# Patient Record
Sex: MALE | Race: WHITE | Employment: OTHER | URBAN - METROPOLITAN AREA
[De-identification: names, ages, dates, MRNs, and addresses within clinical notes are randomized per-mention and may not be internally consistent; named-entity substitution may affect disease eponyms.]

---

## 2017-02-02 ENCOUNTER — OFFICE VISIT (OUTPATIENT)
Dept: NEUROLOGY | Age: 72
End: 2017-02-02

## 2017-02-02 DIAGNOSIS — R41.3 MEMORY LOSS: Primary | ICD-10-CM

## 2017-02-02 DIAGNOSIS — F31.31 BIPOLAR AFFECTIVE DISORDER, CURRENTLY DEPRESSED, MILD (HCC): ICD-10-CM

## 2017-02-02 NOTE — PROGRESS NOTES
[address] [facility],[address]  [address] [facility]   [facility] [address] [facility] [address] [facility]   [phone] Office   [phone] Fax      Neuropsychology    Exam # 2      Initial Diagnostic Interview Note      Referral:  Megan Bella MD    Landy Stevens is a 80 y.o. right handed  male who was accompanied to the initial clinical interview on 2/2/17 . Please refer to his medical records for details pertaining to his history. When I saw him last: Landy Stevens is a 72 y.o. right handed   male who was accompanied by his spouse to the initial clinical interview on 8/18/15. Please refer to his medical records for details pertaining to his history. He has a PhD in Philosophy and denied any history of previously diagnosed LD and/or receipt of special education services. 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 He has noticed that he has more difficulties remembering names and difficulties coming up with words. Thought process slower. Slower to process information. Used to be very good at works. Mild short term memory issues on the day-to-day basis. More difficulties following numbers. He has a harder time transcribing messages on the phone. Took two phone numbers down and spouse had found that he had made mistakes on both of the phone numbers. Trouble transposing numbers at times on paper. Hears something but writes something different. No previous history of ADD/ADHD issues. The memory problems have started to get worse over the past two years or so. Recognizes that everybody's memory starts to fade, but this is faster. Something does not feel normal. Will struggle when reading instructions and following them. Used to be very good at following these directions. Spouse has to double check things he does, which frustrates him. Missing details.  No major known neurologic history, including no recent TBI, meningitis/encephalitis, ATIYA Fever, Lupus, Lyme, CVA, sz, toxic chemical exposure requiring treatment, etc. Used to be able to find his way back from a new place when driving, now cannot. He is independent for medications. No problems paying the bills. Day-to-day chores okay. Sleep varies. Appetite is fine. Balance is less good now than it was previously. No falls, but trips, and slides into walls. Gross motor coordination not as good. Fine motor coordination was never good. No changes in sense of taste or smell. No changes in libido. Is on Lithium right now. Not feeling much help with antidepressant but concern is that if those meds are upped he may become manic. For relaxation, he enjoys woodworking, watching porn occasionally. His father - the last year or two of his life had dementia, he passed away at 80. His mother may have dementia now, she is 80. My diagnostic impressions at that time included: This patient generated a mixed normal/abnormal range Neuropsychological Evaluation with respect to neurocognitive functioning. In this regard, he is showing problems with verbal fluency, sustained attention, auditory learning, auditory memory, and bilateral motor skills. His IQ is within the very superior range and his performance across all other neurocognitive domains assessed, including mental status, confrontation naming, processing speed, working memory, verbal comprehension, perceptual reasoning, and executive functioning remain normal. Emotionally, there is support for both depression and anxiety . He reports being diagnosed with bipolar disorder in the past but that was not seen during this evaluation.      In my opinion, this profile is consistent with an evolving organic process that is currently at a mild level of severity. It is masked to some degree by his excellent IQ and verbal skills (and other neurocognitive domain strengths listed above). AD likely.  The profile is inconsistent with pseudodementia, but mood issues will exacerbate. I suggest consideration for memory management medication, medication for attention, and a review of his current psychiatric medication management for anxiety and depression if this/these are not medically contraindicated. Mental health counseling is strongly advised and his risk for self-harm needs to be monitored by his mental health care providers. The patient should be encouraged to remain as mentally, socially, and physically active as possible.      The patient's generated cognitive difficulties are significant to the degree whereby it is my opinion that he should not live independently without appropriate supervision for medications and finances. While competent, he also likely needs assistance with high level decision making. He can assign a POA if this has not been done so already. Marked problems with attention and reaction time raise concern regarding driving safety, and I suggest consideration for a formal evaluation of same. This issue has been caught early on, and I hope that he and the family recognize this as positive. Baseline now established. Follow up prn. Clinical correlation is, of course, indicated.     I will discuss these findings with the patient and family when they follow up with me in the near future. A follow up Neuropsychological Evaluation is indicated on a prn basis.      DIAGNOSES: Dementia Without Behavioral Disturbance (Mild)   Anxiety - Mild  Depression - Mild  Hx of Bipolar Disorder    Since I saw him last, the patient reported that, compared to when he was tested last time, he has noticed an improvement in his memory. Spouse says he struggles with names, telephone numbers, and dates. Continues to sometime forget the content of conversations. Keeps a journal, and will watch a movie in the evening, and next morning writes in his journal, and 75% of the time he has to go to the listing of the movie to remember the name of the movie. He is on Aricept.   Also on medication for mood (see below). He had a bout of depression in October which lasted persistently. Is being followed by Psychiatry. Switched to Zoloft (from buproprion). Depression lifted last week. Sees Dr. Cecil Montes De Oca. No new neurologic issues since I saw him last.  Spouse does say that he is better, is not as foggy, but memory issues do persist.  Independent for driving, medications, finances, day-to-day chores, etc.  Sleep is poor/worse. Has always had insomnia and it has gotten worse. Appetite is okay. No falls. Tried acupuncture. Mood is better as depression lifted last year. Can look things up on Google, directions, etc.      MMSE: do today    Clock: do today    TOPF:  47    Historian: Good  Praxis: No UE apraxia  R/L Orientation: Intact to self and to other  Dress: within normal limits   Weight: within normal limits   Appearance/Hygiene: within normal limits   Gait: within normal limits   Assistive Devices: Glasses  Mood: within normal limits   Affect: within normal limits   Comprehension: within normal limits   Thought Process: within normal limits   Expressive Language: within normal limits   Receptive Language: within normal limits   Motor:  No cognitive or motor perseveration  ETOH: Denied  Tobacco: fell off wagon twice but not smoking anymore  Illicit: Marijuana, uses every day, 1 very small pipe per day. About a month now, he started doing meditation, and kept himself clear 15 days before he started, 30 days after he started, so no marijuana in the past five weeks. SI/HI: Has passive suicidal thoughts without plan or intent. Was going to hang himself in 2008, but told spouse. Did have an attempt in 1980. Was hospitalized 2/2 the suicide attempt. No current, but did from October - passive thoughts, since depression has improved, no current thoughts. Denied HI. Psychosis: Denied  Insight: Within normal limits  Judgment: Within normal limits  Other Psych: Was diagnosed with Bipolar in 1996. Psychiatrist is Dr. Bear Chakraborty and [place]. Is not currently involved in psychotherapy. Is in marital therapy. Past Medical History   Diagnosis Date    Alzheimer disease 11/2016    Hughes's esophagus with esophagitis     CAD (coronary artery disease)      minor calcification in heart    Chronic obstructive pulmonary disease (HCC)      mild    Diverticulitis     Essential tremor     Prostate cancer ([facility] [address] 9/2004     prostatectomy    Psychiatric disorder      bipolar    Psychotic disorder     Restless leg syndrome        Past Surgical History   Procedure Laterality Date    Hx prostatectomy  2004    Hx tonsillectomy      Hx heent       deviated septum repair       Allergies   Allergen Reactions    Sulfa (Sulfonamide Antibiotics) Nausea and Vomiting     Oral only       Family History   Problem Relation Age of Onset    Diabetes Father     Cancer Maternal Grandmother      stomach cancer    Heart Disease Maternal Grandmother     Stroke Maternal Grandmother     Cancer Other     Heart Disease Other        Social History   Substance Use Topics    Smoking status: Former Smoker     Packs/day: 1.00     Years: 50.00     Quit date: 9/18/2014    Smokeless tobacco: Never Used    Alcohol use No       Current Outpatient Prescriptions   Medication Sig Dispense Refill    sertraline (ZOLOFT) 25 mg tablet Take  by mouth daily.  primidone (MYSOLINE) 50 mg tablet Take 1 Tab by mouth daily (with dinner). 30 Tab 6    donepezil (ARICEPT) 5 mg tablet TAKE 1 TAB BY MOUTH NIGHTLY. 30 Tab 11    omeprazole (PRILOSEC) 40 mg capsule Take 40 mg by mouth Daily (before breakfast).  COCONUT OIL PO Take  by mouth. 7.5 mL by mouth each morning, 7.5 mL at midday, 5 mL at dinner and 5 mL at bedtime      buPROPion (WELLBUTRIN) 100 mg tablet Take 1 Tab by mouth daily. 90 Tab 3    lithium carbonate 300 mg tablet Take 1 Tab by mouth two (2) times a day.  60 Tab 11    multivitamin (ONE A DAY) tablet Take 1 Tab by mouth every evening.  Aspirin, Buffered 81 mg tab Take 81 mg by mouth daily. Plan:  Obtain authorization for testing from insurance company. Report to follow once testing, scoring, and interpretation completed. ? Organic based neurocognitive issues versus mood disorder or combination of same. ? Problems organic, functional, or both? This note will not be viewable in [address]

## 2017-02-07 ENCOUNTER — OFFICE VISIT (OUTPATIENT)
Dept: NEUROLOGY | Age: 72
End: 2017-02-07

## 2017-02-07 DIAGNOSIS — G31.84 MINIMAL COGNITIVE IMPAIRMENT: ICD-10-CM

## 2017-02-07 DIAGNOSIS — Z86.59 HISTORY OF BIPOLAR DISORDER: ICD-10-CM

## 2017-02-07 DIAGNOSIS — R41.3 MEMORY LOSS: Primary | ICD-10-CM

## 2017-02-07 DIAGNOSIS — Z86.59 HISTORY OF ANXIETY: ICD-10-CM

## 2017-02-07 DIAGNOSIS — Z86.59 HISTORY OF DEPRESSION: ICD-10-CM

## 2017-02-07 NOTE — LETTER
2/8/2017 11:07 AM 
 
Patient:  Yohana Soto YOB: 1945 Date of Visit: 2/7/2017 Dear MD Emil Gaffnye Mag [facility] [id number] [address] [facility] [phone] VIA In Basket 
 : Thank you for referring Mr. Magdalena Robledo to me for evaluation/treatment. As you know, I have seen him twice now. During the first evaluation, there were clear signs of a mild dementia exacerbated by depression and anxiety. Despite a reported history of bipolar, I did not find evidence of same. He has been on Aricept and reports that his memory has improved and he is less foggy and his mood has improved as well. The spouse reports his memory as having improved but still impaired and clearly not back to baseline. She also reports that his mood has improved. On the current evaluation, the patient is showing a decline in attention and an improvement in memory to the point where most memory scores are now normal.  If this was a pseudodementia, we would not see the decline in attention, nor would we have seen other neurocognitive deficits that he is showing. Further complicating this atypical presentation is his very high IQ. Thus, this profile does not cleanly fit into any specific category, and I have not seen such a case in quite a long time. As such (and my report may be discombobulated), it may be wise to continue Aricept and to consider a medication for attention (non-stimulant, perhaps?) A referral to Neurology may also be wise. I have not seen a case where mood and memory improves, but attention declines, and have it not be anything other than dementia of atypical progression/onset. I could be wrong and this is all pseudodementia, but the neurocognitive declines seen would then not be explainable, either. Sorry, I feel my report is not as helpful as I had hoped.   I would like to see him again in six months to track this change better and with three data points in time I may be able to better clarify. If you have questions, please do not hesitate to call me. I look forward to following Mr. Holman along with you. Sincerely, Bethany Kang PsyD

## 2017-02-08 NOTE — PROGRESS NOTES
[address] [facility],5Th Floor  [address] [facility]   [facility] [address] [facility], Terri Bray .   [phone] Office   [phone] Fax      Neuropsychological Evaluation Report    Exam # 2    Referral:  Benjamin Solares MD    Daniele Jay is a 80 y.o. right handed  male who was accompanied to the initial clinical interview on 2/2/17 . Please refer to his medical records for details pertaining to his history. When I saw him last: Daniele Jay is a 72 y.o. right handed   male who was accompanied by his spouse to the initial clinical interview on 8/18/15. Please refer to his medical records for details pertaining to his history. He has a PhD in Philosophy and denied any history of previously diagnosed LD and/or receipt of special education services. 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 He has noticed that he has more difficulties remembering names and difficulties coming up with words. Thought process slower. Slower to process information. Used to be very good at works. Mild short term memory issues on the day-to-day basis. More difficulties following numbers. He has a harder time transcribing messages on the phone. Took two phone numbers down and spouse had found that he had made mistakes on both of the phone numbers. Trouble transposing numbers at times on paper. Hears something but writes something different. No previous history of ADD/ADHD issues. The memory problems have started to get worse over the past two years or so. Recognizes that everybody's memory starts to fade, but this is faster. Something does not feel normal. Will struggle when reading instructions and following them. Used to be very good at following these directions. Spouse has to double check things he does, which frustrates him. Missing details.  No major known neurologic history, including no recent TBI, meningitis/encephalitis, ATIYA Fever, Lupus, Lyme, CVA, sz, toxic chemical exposure requiring treatment, etc. Used to be able to find his way back from a new place when driving, now cannot. He is independent for medications. No problems paying the bills. Day-to-day chores okay. Sleep varies. Appetite is fine. Balance is less good now than it was previously. No falls, but trips, and slides into walls. Gross motor coordination not as good. Fine motor coordination was never good. No changes in sense of taste or smell. No changes in libido. Is on Lithium right now. Not feeling much help with antidepressant but concern is that if those meds are upped he may become manic. For relaxation, he enjoys woodworking, watching porn occasionally. His father - the last year or two of his life had dementia, he passed away at 80. His mother may have dementia now, she is 80. My diagnostic impressions at that time included: This patient generated a mixed normal/abnormal range Neuropsychological Evaluation with respect to neurocognitive functioning. In this regard, he is showing problems with verbal fluency, sustained attention, auditory learning, auditory memory, and bilateral motor skills. His IQ is within the very superior range and his performance across all other neurocognitive domains assessed, including mental status, confrontation naming, processing speed, working memory, verbal comprehension, perceptual reasoning, and executive functioning remain normal. Emotionally, there is support for both depression and anxiety . He reports being diagnosed with bipolar disorder in the past but that was not seen during this evaluation.      In my opinion, this profile is consistent with an evolving organic process that is currently at a mild level of severity. It is masked to some degree by his excellent IQ and verbal skills (and other neurocognitive domain strengths listed above). AD likely. The profile is inconsistent with pseudodementia, but mood issues will exacerbate.  I suggest consideration for memory management medication, medication for attention, and a review of his current psychiatric medication management for anxiety and depression if this/these are not medically contraindicated. Mental health counseling is strongly advised and his risk for self-harm needs to be monitored by his mental health care providers. The patient should be encouraged to remain as mentally, socially, and physically active as possible.      The patient's generated cognitive difficulties are significant to the degree whereby it is my opinion that he should not live independently without appropriate supervision for medications and finances. While competent, he also likely needs assistance with high level decision making. He can assign a POA if this has not been done so already. Marked problems with attention and reaction time raise concern regarding driving safety, and I suggest consideration for a formal evaluation of same. This issue has been caught early on, and I hope that he and the family recognize this as positive. Baseline now established. Follow up prn. Clinical correlation is, of course, indicated.     I will discuss these findings with the patient and family when they follow up with me in the near future. A follow up Neuropsychological Evaluation is indicated on a prn basis.      DIAGNOSES: Dementia Without Behavioral Disturbance (Mild)   Anxiety - Mild  Depression - Mild  Hx of Bipolar Disorder    Since I saw him last, the patient reported that, compared to when he was tested last time, he has noticed an improvement in his memory. Spouse says he struggles with names, telephone numbers, and dates. Continues to sometime forget the content of conversations. Keeps a journal, and will watch a movie in the evening, and next morning writes in his journal, and 75% of the time he has to go to the listing of the movie to remember the name of the movie. He is on Aricept.   Also on medication for mood (see below). He had a bout of depression in October which lasted persistently. Is being followed by Psychiatry. Switched to Zoloft (from buproprion). Depression lifted last week. Sees Dr. Aleks Ruvalcaba. No new neurologic issues since I saw him last.  Spouse does say that he is better, is not as foggy, but memory issues do persist.  Independent for driving, medications, finances, day-to-day chores, etc.  Sleep is poor/worse. Has always had insomnia and it has gotten worse. Appetite is okay. No falls. Tried acupuncture. Mood is better as depression lifted last year. Can look things up on Google, directions, etc.      MMSE: do today    Clock: do today    TOPF:  47    Historian: Good  Praxis: No UE apraxia  R/L Orientation: Intact to self and to other  Dress: within normal limits   Weight: within normal limits   Appearance/Hygiene: within normal limits   Gait: within normal limits   Assistive Devices: Glasses  Mood: within normal limits   Affect: within normal limits   Comprehension: within normal limits   Thought Process: within normal limits   Expressive Language: within normal limits   Receptive Language: within normal limits   Motor:  No cognitive or motor perseveration  ETOH: Denied  Tobacco: fell off wagon twice but not smoking anymore  Illicit: Marijuana, uses every day, 1 very small pipe per day. About a month now, he started doing meditation, and kept himself clear 15 days before he started, 30 days after he started, so no marijuana in the past five weeks. SI/HI: Has passive suicidal thoughts without plan or intent. Was going to hang himself in 2008, but told spouse. Did have an attempt in 1980. Was hospitalized 2/2 the suicide attempt. No current, but did from October - passive thoughts, since depression has improved, no current thoughts. Denied HI. Psychosis: Denied  Insight: Within normal limits  Judgment: Within normal limits  Other Psych: Was diagnosed with Bipolar in 1996.  [facility]. Is not currently involved in psychotherapy. Is in marital therapy. Past Medical History   Diagnosis Date    Alzheimer disease 11/2016    Hughes's esophagus with esophagitis     CAD (coronary artery disease)      minor calcification in heart    Chronic obstructive pulmonary disease (HCC)      mild    Diverticulitis     Essential tremor     Prostate cancer ([facility] [address] 9/2004     prostatectomy    Psychiatric disorder      bipolar    Psychotic disorder     Restless leg syndrome        Past Surgical History   Procedure Laterality Date    Hx prostatectomy  2004    Hx tonsillectomy      Hx heent       deviated septum repair       Allergies   Allergen Reactions    Sulfa (Sulfonamide Antibiotics) Nausea and Vomiting     Oral only       Family History   Problem Relation Age of Onset    Diabetes Father     Cancer Maternal Grandmother      stomach cancer    Heart Disease Maternal Grandmother     Stroke Maternal Grandmother     Cancer Other     Heart Disease Other        Social History   Substance Use Topics    Smoking status: Former Smoker     Packs/day: 1.00     Years: 50.00     Quit date: 9/18/2014    Smokeless tobacco: Never Used    Alcohol use No       Current Outpatient Prescriptions   Medication Sig Dispense Refill    sertraline (ZOLOFT) 25 mg tablet Take  by mouth daily.  primidone (MYSOLINE) 50 mg tablet Take 1 Tab by mouth daily (with dinner). 30 Tab 6    donepezil (ARICEPT) 5 mg tablet TAKE 1 TAB BY MOUTH NIGHTLY. 30 Tab 11    omeprazole (PRILOSEC) 40 mg capsule Take 40 mg by mouth Daily (before breakfast).  COCONUT OIL PO Take  by mouth. 7.5 mL by mouth each morning, 7.5 mL at midday, 5 mL at dinner and 5 mL at bedtime      buPROPion (WELLBUTRIN) 100 mg tablet Take 1 Tab by mouth daily. 90 Tab 3    lithium carbonate 300 mg tablet Take 1 Tab by mouth two (2) times a day. 60 Tab 11    multivitamin (ONE A DAY) tablet Take 1 Tab by mouth every evening.  Aspirin, Buffered 81 mg tab Take 81 mg by mouth daily. Plan:  Obtain authorization for testing from insurance company. Report to follow once testing, scoring, and interpretation completed. ? Organic based neurocognitive issues versus mood disorder or combination of same. ? Problems organic, functional, or both? This note will not be viewable in [address] Neuropsychological Test Results  Patient Testing 2/7/17 Report Completed 2/8/17  A Psychometrist Assisted w/ portions of this evaluation while under my direct  supervision    The following evaluation procedures/tests were administered:      Neuropsychologist Performed, Interpreted, & Reported:  Neuropsychological Mental Status Exam, Revised Memory & Behavior Checklist,  Mini Mental Status Exam, Clock Drawing Test, Merna-Melzack Pain Questionnaire, Test Of Premorbid Functioning, History Taking  & Clinical Interview With The Patient, Additional HIstory Taking w/ The Patient's Spouse,  Review Of Available Records. Psychometrist Administered under Neuropsychologist Supervision & Neuropsychologist Interpreted & Neuropsychologist Reported:  Verbal Fluency Tests, [facility] - Revised, Trailmaking Test Parts A & B, Wechsler Adult Intelligence Scale - IV, Dory Continuous Performance Test - III, [place] [employer] - II, Grooved Pegboard, Delgado Depression Inventory - II, Delgado Anxiety Inventory, Personality Assessment Inventory. Test Findings:  Test Findings:  Note:  The patients raw data have been compared with currently available norms which include demographic corrections for age, gender, and/or education. Sometimes, the patients scores are compared to demographically similar individuals as close to the patients age, education level, etc., as possible. \"Average\" is viewed as being +/- 1 standard deviation (SD) from the stated mean for a particular test score.   \"Low average\" is viewed as being between 1 and 2 SD below the mean, and above average is viewed as being 1 and 2 SD above the mean. Scores falling in the borderline range (between 1-1/2 and 2 SD below the mean) are viewed with particular attention as to whether they are normal or abnormal neurocognitive test scores. Other methods of inference in analyzing the test data are also utilized, including the pattern and range of scores in the profile, bilateral motor functions, and the presence, if any, of pathognomonic signs. Behaviorally, the patient was friendly and cooperative and appeared motivated to perform well during this examination. Within this context, the results of this evaluation are viewed as a valid reflection of the patients actual neurocognitive and emotional status. The patient's score of 28/30 on the Mini-Mental Status Exam was normal.  Registration of three words was 2/3 correct. Recall for three words after a brief delay was 2/3 correct. Clock drawing was normal.        His structured word list fluency, as assessed by the FAS Test, was within the mildly impaired range with a T score of 38. Category fluency was within the average range with a T score of 48. Confrontation naming ability, as assessed by the [facility] Test - Revised, was within the  Above average range at 60/60 correct (T = 68). This pattern of performance is not indicative of a patient who is at increased risk for day-to-day problems with verbal fluency or confrontation naming. The patient was administered the Dory Continuous Performance Test - III and review of the subscales within this instrument revealed numerous concerns for inattentiveness without impulsivity. This pattern of performance is indicative of a patient who is at increased risk for day-to-day problems with sustained visual attention/concentration. The patient is not showing problems with working memory capacity (70th %ile) or processing speed (63rd %ile) on the WAIS-IV.   His Verbal Comprehension Index score of 120 was within the superior range. His Perceptual Reasoning Index score of 117 was high average. These scores do not reflect a decline in functioning based on an assessment of premorbid functioning. The patient was administered the [place] Verbal Learning Test  - II and generated an impaired range (though positive) learning curve over five repeated auditory word list learning trials. An interference trial was normal.   Free and cued, short and long delayed recall were mildly impaired, a significant improvement from previous testing. Recognition rcall was normal but some intrusion errors were noted. Forced choice recall was normal.  This pattern of performance is indicative of a patient who is at increased risk for day-to-day problems with auditory learning and/or memory. The problem is quite mild. Simple timed visual motor sequencing (Trailmaking Test Part A) was within the average range with a T score of 45. His performance on a similar, but more complex task of timed visual motor sequencing (Trailmaking Test Part B) was within the below average range with a T score of 42. He made zero sequencing errors on this latter test.   This pattern of performance is not indicative of a patient who is at increased risk for day-to-day problems with executive functioning. Fine motor dexterity was within the mildly to moderately impaired range bilaterally. This does not raise concern for a particularly lateralized brain dysfunction. The patient rated his current level of pain as \"2/5 -  Discomforting\" on the Merna-Melzack Pain Questionnaire. He reported pain in his neck and upper back. His  Delgado Depression Inventory -II score of 5 was within the minimally depressed range. Her Delgado Anxiety Inventory score of 6 reflected minimal anxiety. The patient's responses on the Personality Assessment Inventory were deemed valid for interpretation.   Within this context, his self-concept is fixed and negative. He may not invite much social interaction, and is withdrawn and introverted. He does report suicidal thoughts on this measure and denied plan or intent with me. His level of treatment motivation is somewhat low. Impressions & Recommendations: This patient again generated a mixed normal/abnormal range Neuropsychological Evaluation with respect to neurocognitive functioning. In this regard, he is showing problems withsustained attention, auditory learning, auditory memory, and bilateral motor skills. His IQ remains within the superior range. Memory has improved significantly since previous testing. His performance across all other neurocognitive domains assessed, including mental status, confrontation naming, processing speed, working memory, verbal comprehension, perceptual reasoning, and executive functioning remain normal. Emotionally, there is support for both depression and anxiety, but both have improved since previous testing. I again do not see evidence of bipolar. In my opinion, dementia is quite stable and memory scores have improved significantly, though the spouse reports that mild issues continue. He is much less \"foggy\". He does have selected areas of ongoing neurocognitive difficulties despite an improvement and mood. It may be wise to keep him on Aricept but I wonder if his mood continues to improve than at next testing his memory scores may improve to normal?  The profile remains inconsistent with a pseudodementia, and it is also noted that his attention has declined over time. Thus, I also recommend treatment for attention issues and ongoing psychiatric treatment and counseling for depression and anxiety. I am not concerned about competency, driving, day-to-day supervision (except for online billpay and using a pillbox), etc.  I hope he is relieved by this very positive news of improvement.     The patient should be encouraged to remain as mentally, socially, and physically active as possible. I would like to see him again as soon as insurance will allow (six to 12 months) to track changes. We now have baseline and updated data on him. Clinical correlation is advised.       I will discuss these findings with the patient and family when they follow up with me in the near future. A follow up Neuropsychological Evaluation is indicated on a prn basis.      DIAGNOSES: MCI versus mild/atypical dementia    Anxiety - (Improved)   Depression - (Improved)   Hx of Bipolar Disorder     The above information is based upon information currently available to me. If there is any additional information of which I am currently unaware, I would be more than happy to review it upon having it made available to me. Thank you for the opportunity to see this interesting individual.     Sincerely,       Vicky Yoo. Skip Springer, [place]    CC:  Bushra Rain MD    2 units -[id number]  Record review. Review of history provided by patient. Review of collaborative information. Testing by Clinician. Review of raw data. Scoring. Report writing of individual tests administered by Clinician. Integration of individual tests administered by psychometrist (that were previously reported and billed under psychometry code below) with testing by clinician and review of records/history/collaborative information. Case Conceptualization, Report writing. Coordination Of Care. 4 units  -[id number] - Psychometrist test prep, administration, and scoring under clinician's direct supervision. Clinical interpretation of individual tests administered by psychometrist .  Clinician report of individual tests administered by psychometrist.    \"Unit\" is defined by CPT/National Guidelines (31 - 60 minutes).   Integral services including scoring of raw data, data interpretation, case conceptualization, report writing etcetera were initiated after the patient finished testing/raw data collected and was completed on the date the report was signed.

## 2017-02-09 NOTE — PROGRESS NOTES
Thank you for referring Mr. Grace Barron to me for evaluation/treatment. As you know, I have seen him twice now. During the first evaluation, there were clear signs of a mild dementia exacerbated by depression and anxiety. Despite a reported history of bipolar, I did not find evidence of same. He has been on Aricept and reports that his memory has improved and he is less foggy and his mood has improved as well. The spouse reports his memory as having improved but still impaired and clearly not back to baseline. She also reports that his mood has improved.     On the current evaluation, the patient is showing a decline in attention and an improvement in memory to the point where most memory scores are now normal. If this was a pseudodementia, we would not see the decline in attention, nor would we have seen other neurocognitive deficits that he is showing. Further complicating this atypical presentation is his very high IQ. Thus, this profile does not cleanly fit into any specific category, and I have not seen such a case in quite a long time.      As such (and my report may be discombobulated), it may be wise to continue Aricept and to consider a medication for attention (non-stimulant, perhaps?) A referral to Neurology may also be wise. I have not seen a case where mood and memory improves, but attention declines, and have it not be anything other than dementia of atypical progression/onset. I could be wrong and this is all pseudodementia, but the neurocognitive declines seen would then not be explainable, either.     Sorry, I feel my report is not as helpful as I had hoped. I would like to see him again in six months to track this change better and with three data points in time I may be able to better clarify.      If you have questions, please do not hesitate to call me. I look forward to following Mr. Holman along with you.           Sincerely,        Jadiel Slade PsyD

## 2017-04-07 ENCOUNTER — OFFICE VISIT (OUTPATIENT)
Dept: NEUROLOGY | Age: 72
End: 2017-04-07

## 2017-04-07 DIAGNOSIS — Z86.59 HISTORY OF DEPRESSION: ICD-10-CM

## 2017-04-07 DIAGNOSIS — G31.84 MINIMAL COGNITIVE IMPAIRMENT: Primary | ICD-10-CM

## 2017-04-07 DIAGNOSIS — Z86.59 HISTORY OF BIPOLAR DISORDER: ICD-10-CM

## 2017-04-07 DIAGNOSIS — Z86.59 HISTORY OF ANXIETY: ICD-10-CM

## 2017-04-07 NOTE — PROGRESS NOTES
Office feedback session with the patient and spouse today. I reviewed the results of the recent Neuropsychological Evaluation, including discussing individual tests as well as patient's areas of neurocognitive strength versus weakness. Education was provided regarding my diagnostic impressions, and we discussed treatment plan/options. I also answered numerous questions related to the clinical findings, including discussing various methods to improve cognition and mood. Counseling provided regarding mood and cognition. The patient will follow up with the referring provider, and reported being very pleased with the services provided. Follow up with [facility] prn. He has been doing meditation as well as Coconut Oil with are both reported to helpful. 20 minutes with patient and spouse, record review, coordination of care. Records provided. We discussed: This patient again generated a mixed normal/abnormal range Neuropsychological Evaluation with respect to neurocognitive functioning. In this regard, he is showing problems withsustained attention, auditory learning, auditory memory, and bilateral motor skills. His IQ remains within the superior range. Memory has improved significantly since previous testing. His performance across all other neurocognitive domains assessed, including mental status, confrontation naming, processing speed, working memory, verbal comprehension, perceptual reasoning, and executive functioning remain normal. Emotionally, there is support for both depression and anxiety, but both have improved since previous testing. I again do not see evidence of bipolar.      In my opinion, dementia is quite stable and memory scores have improved significantly, though the spouse reports that mild issues continue. He is much less \"foggy\". He does have selected areas of ongoing neurocognitive difficulties despite an improvement and mood.  It may be wise to keep him on Aricept but I wonder if his mood continues to improve than at next testing his memory scores may improve to normal? The profile remains inconsistent with a pseudodementia, and it is also noted that his attention has declined over time. Thus, I also recommend treatment for attention issues and ongoing psychiatric treatment and counseling for depression and anxiety. I am not concerned about competency, driving, day-to-day supervision (except for online billpay and using a pillbox), etc. I hope he is relieved by this very positive news of improvement. The patient should be encouraged to remain as mentally, socially, and physically active as possible. I would like to see him again as soon as insurance will allow (six to 12 months) to track changes. We now have baseline and updated data on him. Clinical correlation is advised.       I will discuss these findings with the patient and family when they follow up with me in the near future. A follow up Neuropsychological Evaluation is indicated on a prn basis.        DIAGNOSES: MCI versus mild/atypical dementia   Anxiety - (Improved)  Depression - (Improved)  Hx of Bipolar Disorder

## 2017-05-22 ENCOUNTER — TELEPHONE (OUTPATIENT)
Dept: NEUROLOGY | Age: 72
End: 2017-05-22

## 2017-05-22 NOTE — TELEPHONE ENCOUNTER
Patient asks for a call back re he believes he's having a reaction to some medication he's been taking. Please call.

## 2017-05-24 RX ORDER — DONEPEZIL HYDROCHLORIDE 5 MG/1
TABLET, FILM COATED ORAL
Qty: 30 TAB | Refills: 11 | Status: SHIPPED | OUTPATIENT
Start: 2017-05-24 | End: 2018-09-26

## 2017-05-24 NOTE — TELEPHONE ENCOUNTER
Patient stated that he is having a reaction to primidone and the Zoloft asked if he spoke with the dr that prescribed the medication and Dr Halima Dc advised him to call our office to see what can be done.    -reaction that the patient is having is being fatigue.  Sleeping lot and no energy and taking 100mg of Zoloft at this time     -Advised Dr. Halima Dc would need to be the one to adjust that medication since he was the one that increased the medication

## 2017-09-20 ENCOUNTER — OFFICE VISIT (OUTPATIENT)
Dept: FAMILY MEDICINE CLINIC | Age: 72
End: 2017-09-20

## 2017-09-20 ENCOUNTER — HOSPITAL ENCOUNTER (OUTPATIENT)
Dept: LAB | Age: 72
Discharge: HOME | End: 2017-09-20
Payer: MEDICARE

## 2017-09-20 VITALS
HEART RATE: 58 BPM | DIASTOLIC BLOOD PRESSURE: 52 MMHG | RESPIRATION RATE: 16 BRPM | OXYGEN SATURATION: 97 % | BODY MASS INDEX: 25.9 KG/M2 | TEMPERATURE: 96.2 F | SYSTOLIC BLOOD PRESSURE: 126 MMHG | WEIGHT: 165 LBS | HEIGHT: 67 IN

## 2017-09-20 DIAGNOSIS — L98.9 SCALP LESION: ICD-10-CM

## 2017-09-20 DIAGNOSIS — Z13.39 SCREENING FOR ALCOHOLISM: ICD-10-CM

## 2017-09-20 DIAGNOSIS — Z51.81 ENCOUNTER FOR LITHIUM MONITORING: ICD-10-CM

## 2017-09-20 DIAGNOSIS — Z00.00 HEALTHCARE MAINTENANCE: ICD-10-CM

## 2017-09-20 DIAGNOSIS — Z00.00 MEDICARE ANNUAL WELLNESS VISIT, SUBSEQUENT: Primary | ICD-10-CM

## 2017-09-20 DIAGNOSIS — Z79.899 ENCOUNTER FOR LITHIUM MONITORING: ICD-10-CM

## 2017-09-20 DIAGNOSIS — Z23 ENCOUNTER FOR IMMUNIZATION: ICD-10-CM

## 2017-09-20 DIAGNOSIS — Z12.11 COLON CANCER SCREENING: ICD-10-CM

## 2017-09-20 PROCEDURE — 80061 LIPID PANEL: CPT

## 2017-09-20 PROCEDURE — 80053 COMPREHEN METABOLIC PANEL: CPT

## 2017-09-20 PROCEDURE — 85027 COMPLETE CBC AUTOMATED: CPT

## 2017-09-20 PROCEDURE — 80178 ASSAY OF LITHIUM: CPT

## 2017-09-20 PROCEDURE — 36415 COLL VENOUS BLD VENIPUNCTURE: CPT

## 2017-09-20 RX ORDER — CLOTRIMAZOLE AND BETAMETHASONE DIPROPIONATE 10; .64 MG/G; MG/G
CREAM TOPICAL 2 TIMES DAILY
COMMUNITY
End: 2022-03-12 | Stop reason: ALTCHOICE

## 2017-09-20 NOTE — PATIENT INSTRUCTIONS
A Healthy Lifestyle: Care Instructions  Your Care Instructions  A healthy lifestyle can help you feel good, stay at a healthy weight, and have plenty of energy for both work and play. A healthy lifestyle is something you can share with your whole family. A healthy lifestyle also can lower your risk for serious health problems, such as high blood pressure, heart disease, and diabetes. You can follow a few steps listed below to improve your health and the health of your family. Follow-up care is a key part of your treatment and safety. Be sure to make and go to all appointments, and call your doctor if you are having problems. Its also a good idea to know your test results and keep a list of the medicines you take. How can you care for yourself at home? · Do not eat too much sugar, fat, or fast foods. You can still have dessert and treats now and then. The goal is moderation. · Start small to improve your eating habits. Pay attention to portion sizes, drink less juice and soda pop, and eat more fruits and vegetables. ¨ Eat a healthy amount of food. A 3-ounce serving of meat, for example, is about the size of a deck of cards. Fill the rest of your plate with vegetables and whole grains. ¨ Limit the amount of soda and sports drinks you have every day. Drink more water when you are thirsty. ¨ Eat at least 5 servings of fruits and vegetables every day. It may seem like a lot, but it is not hard to reach this goal. A serving or helping is 1 piece of fruit, 1 cup of vegetables, or 2 cups of leafy, raw vegetables. Have an apple or some carrot sticks as an afternoon snack instead of a candy bar. Try to have fruits and/or vegetables at every meal.  · Make exercise part of your daily routine. You may want to start with simple activities, such as walking, bicycling, or slow swimming. Try to be active 30 to 60 minutes every day. You do not need to do all 30 to 60 minutes all at once.  For example, you can exercise 3 times a day for 10 or 20 minutes. Moderate exercise is safe for most people, but it is always a good idea to talk to your doctor before starting an exercise program.  · Keep moving. Corey Boron the lawn, work in the garden, or I Read Books. Take the stairs instead of the elevator at work. · If you smoke, quit. People who smoke have an increased risk for heart attack, stroke, cancer, and other lung illnesses. Quitting is hard, but there are ways to boost your chance of quitting tobacco for good. ¨ Use nicotine gum, patches, or lozenges. ¨ Ask your doctor about stop-smoking programs and medicines. ¨ Keep trying. In addition to reducing your risk of diseases in the future, you will notice some benefits soon after you stop using tobacco. If you have shortness of breath or asthma symptoms, they will likely get better within a few weeks after you quit. · Limit how much alcohol you drink. Moderate amounts of alcohol (up to 2 drinks a day for men, 1 drink a day for women) are okay. But drinking too much can lead to liver problems, high blood pressure, and other health problems. Family health  If you have a family, there are many things you can do together to improve your health. · Eat meals together as a family as often as possible. · Eat healthy foods. This includes fruits, vegetables, lean meats and dairy, and whole grains. · Include your family in your fitness plan. Most people think of activities such as jogging or tennis as the way to fitness, but there are many ways you and your family can be more active. Anything that makes you breathe hard and gets your heart pumping is exercise. Here are some tips:  ¨ Walk to do errands or to take your child to school or the bus. ¨ Go for a family bike ride after dinner instead of watching TV. Where can you learn more? Go to http://rowena-jalen.info/. Enter F564 in the search box to learn more about \"A Healthy Lifestyle: Care Instructions. \"  Current as of: July 26, 2016  Content Version: 11.3  © 7510-8675 [employer], [employer]. Care instructions adapted under license by [employer] (which disclaims liability or warranty for this information). If you have questions about a medical condition or this instruction, always ask your healthcare professional. [address] any warranty or liability for your use of this information. Medicare Wellness Visit, Male    The best way to live healthy is to have a healthy lifestyle by eating a well-balanced diet, exercising regularly, limiting alcohol and stopping smoking. Regular physical exams and screening tests are another way to keep healthy. Preventive exams provided by your health care provider can find health problems before they become diseases or illnesses. Preventive services including immunizations, screening tests, monitoring and exams can help you take care of your own health. All people over age 72 should have a pneumovax  and and a prevnar shot to prevent pneumonia. These are once in a lifetime unless you and your provider decide differently. All people over 65 should have a yearly flu shot and a tetanus vaccine every 10 years. Screening for diabetes mellitus with a blood sugar test should be done every year. Glaucoma is a disease of the eye due to increased ocular pressure that can lead to blindness and it should be done every year by an eye professional.    Cardiovascular screening tests that check for elevated lipids (fatty part of blood) which can lead to heart disease and strokes should be done every 5 years. Colorectal screening that evaluates for blood or polyps in your colon should be done yearly as a stool test or every five years as a flexible sigmoidoscope or every 10 years as a colonoscopy up to age 76. Men up to age 76 may need a screening blood test for prostate cancer at certain intervals, depending on their personal and family history.  This decision is between the patient and his provider. If you have been a smoker or had family history of abdominal aortic aneurysms, you and your provider may decide to schedule an ultrasound test of your aorta. Hepatitis C screening is also recommended for anyone born between [birth year] through [address]. A shingles vaccine is also recommended once in a lifetime after age 61. Your Medicare Wellness Exam is recommended annually.     Here is a list of your current Health Maintenance items with a due date:  Health Maintenance Due   Topic Date Due    Stool testing for trace blood  04/24/2017    Annual Well Visit  09/14/2017

## 2017-09-20 NOTE — MR AVS SNAPSHOT
Visit Information Date & Time Provider Department Dept. Phone Encounter #  
 9/20/2017 11:05 AM Laurence Rivera MD [facility] [phone] [id number] Upcoming Health Maintenance Date Due FOBT Q 1 YEAR AGE 50-75 4/24/2017 INFLUENZA AGE 9 TO ADULT 8/1/2017 MEDICARE YEARLY EXAM 9/14/2017 GLAUCOMA SCREENING Q2Y 9/13/2018 DTaP/Tdap/Td series (2 - Td) 7/1/2024 Allergies as of 9/20/2017  Review Complete On: 9/20/2017 By: Ainsley Vallejo LPN Severity Noted Reaction Type Reactions Sulfa (Sulfonamide Antibiotics)  07/01/2014    Nausea and Vomiting Oral only Current Immunizations  Reviewed on 9/13/2016 Name Date Influenza High Dose Vaccine PF  Incomplete, 9/1/2016 Pneumococcal Conjugate (PCV-13) 7/28/2015 Pneumococcal Polysaccharide (PPSV-23) 7/1/2014 Tdap 7/1/2014 Varicella Virus Vaccine 7/28/2013 Not reviewed this visit You Were Diagnosed With   
  
 Codes Comments Encounter for immunization    -  Primary ICD-10-CM: F26 ICD-9-CM: V03.89 Healthcare maintenance     ICD-10-CM: Z00.00 ICD-9-CM: V70.0 Scalp lesion     ICD-10-CM: L98.9 ICD-9-CM: 709.9 Encounter for lithium monitoring     ICD-10-CM: Z51.81, [id number] ICD-9-CM: V58.83, V58.69 Vitals BP Pulse Temp Resp Height(growth percentile) Weight(growth percentile) 126/52 (!) 58 96.2 °F (35.7 °C) (Oral) 16 5' 7\" (1.702 m) 165 lb (74.8 kg) SpO2 BMI Smoking Status 97% 25.84 kg/m2 Former Smoker BMI and BSA Data Body Mass Index Body Surface Area  
 25.84 kg/m 2 1.88 m 2 Preferred Pharmacy Pharmacy Name Phone [id number] - [address], [address] [place] [address] [phone] Your Updated Medication List  
  
   
This list is accurate as of: 9/20/17  1:06 PM.  Always use your most recent med list.  
  
  
  
  
 aspirin, buffered 81 mg Tab Take 81 mg by mouth daily. clotrimazole-betamethasone topical cream  
Commonly known as:  Eunice Casi Apply  to affected area two (2) times a day. COCONUT OIL PO Take  by mouth. 7.5 mL by mouth each morning, 7.5 mL at midday, 5 mL at dinner and 5 mL at bedtime  
  
 donepezil 5 mg tablet Commonly known as:  ARICEPT  
TAKE 1 TABLET BY MOUTH NIGHTLY. lithium carbonate 300 mg tablet Take 1 Tab by mouth two (2) times a day. multivitamin tablet Commonly known as:  ONE A DAY Take 1 Tab by mouth every evening. omeprazole 40 mg capsule Commonly known as:  PRILOSEC Take 40 mg by mouth Daily (before breakfast). * OTHER Moringa oleifera 200mg daily * OTHER Kefir 118ml daily  
  
 primidone 50 mg tablet Commonly known as: MYSOLINE Take 1 Tab by mouth daily (with dinner). XIFAXAN 550 mg tablet Generic drug:  rifAXIMin Take 550 mg by mouth two (2) times a day. ZOLOFT 25 mg tablet Generic drug:  sertraline Take 75 mg by mouth daily. * Notice: This list has 2 medication(s) that are the same as other medications prescribed for you. Read the directions carefully, and ask your doctor or other care provider to review them with you. We Performed the Following ADMIN INFLUENZA VIRUS VAC [ HCPCS] AMB POC FECAL BLOOD, OCCULT, QL 3 CARDS [94062 CPT(R)] CBC W/O DIFF [83190 CPT(R)] INFLUENZA VIRUS VACCINE, HIGH DOSE SEASONAL, PRESERVATIVE FREE [02041 CPT(R)] LIPID PANEL [07913 CPT(R)] LITHIUM [id number] CPT(R)] METABOLIC PANEL, COMPREHENSIVE [99605 CPT(R)] REFERRAL TO DERMATOLOGY [REF19 Custom] Comments:  
 Please evaluate patient for lesion on scalp To-Do List   
 09/25/2017 11:30 AM  
  Appointment with [facility] 2 at [facility] ([phone]) NON-CONTRAST STUDY: 1. Bring any non [facility] facility films/images pertaining to the area of interest with you on the day of appointment.  2. Check in at registration at least 30 minutes before appt time unless you were instructed to do otherwise. 3. If you have to drink oral contrast please pick it up any weekday prior to your appointment, if you cannot please check in 2 hrs  before appt time. Referral Information Referral ID Referred By Referred To  
  
 [id number] Aris Hence Not Available Visits Status Start Date End Date 1 New Request 9/20/17 9/20/18 If your referral has a status of pending review or denied, additional information will be sent to support the outcome of this decision. Patient Instructions A Healthy Lifestyle: Care Instructions Your Care Instructions A healthy lifestyle can help you feel good, stay at a healthy weight, and have plenty of energy for both work and play. A healthy lifestyle is something you can share with your whole family. A healthy lifestyle also can lower your risk for serious health problems, such as high blood pressure, heart disease, and diabetes. You can follow a few steps listed below to improve your health and the health of your family. Follow-up care is a key part of your treatment and safety. Be sure to make and go to all appointments, and call your doctor if you are having problems. Its also a good idea to know your test results and keep a list of the medicines you take. How can you care for yourself at home? · Do not eat too much sugar, fat, or fast foods. You can still have dessert and treats now and then. The goal is moderation. · Start small to improve your eating habits. Pay attention to portion sizes, drink less juice and soda pop, and eat more fruits and vegetables. ¨ Eat a healthy amount of food. A 3-ounce serving of meat, for example, is about the size of a deck of cards. Fill the rest of your plate with vegetables and whole grains. ¨ Limit the amount of soda and sports drinks you have every day. Drink more water when you are thirsty. ¨ Eat at least 5 servings of fruits and vegetables every day. It may seem like a lot, but it is not hard to reach this goal. A serving or helping is 1 piece of fruit, 1 cup of vegetables, or 2 cups of leafy, raw vegetables. Have an apple or some carrot sticks as an afternoon snack instead of a candy bar. Try to have fruits and/or vegetables at every meal. 
· Make exercise part of your daily routine. You may want to start with simple activities, such as walking, bicycling, or slow swimming. Try to be active 30 to 60 minutes every day. You do not need to do all 30 to 60 minutes all at once. For example, you can exercise 3 times a day for 10 or 20 minutes. Moderate exercise is safe for most people, but it is always a good idea to talk to your doctor before starting an exercise program. 
· Keep moving. Clent Cramp the lawn, work in the garden, or [employer]. Take the stairs instead of the elevator at work. · If you smoke, quit. People who smoke have an increased risk for heart attack, stroke, cancer, and other lung illnesses. Quitting is hard, but there are ways to boost your chance of quitting tobacco for good. ¨ Use nicotine gum, patches, or lozenges. ¨ Ask your doctor about stop-smoking programs and medicines. ¨ Keep trying. In addition to reducing your risk of diseases in the future, you will notice some benefits soon after you stop using tobacco. If you have shortness of breath or asthma symptoms, they will likely get better within a few weeks after you quit. · Limit how much alcohol you drink. Moderate amounts of alcohol (up to 2 drinks a day for men, 1 drink a day for women) are okay. But drinking too much can lead to liver problems, high blood pressure, and other health problems. Family health If you have a family, there are many things you can do together to improve your health. · Eat meals together as a family as often as possible. · Eat healthy foods.  This includes fruits, vegetables, lean meats and dairy, and whole grains. · Include your family in your fitness plan. Most people think of activities such as jogging or tennis as the way to fitness, but there are many ways you and your family can be more active. Anything that makes you breathe hard and gets your heart pumping is exercise. Here are some tips: 
¨ Walk to do errands or to take your child to school or the bus. ¨ Go for a family bike ride after dinner instead of watching TV. Where can you learn more? Go to http://rowena-jalen.info/. Enter E479 in the search box to learn more about \"A Healthy Lifestyle: Care Instructions. \" Current as of: July 26, 2016 Content Version: 11.3 © 0176-3924 [employer]. Care instructions adapted under license by [employer] (which disclaims liability or warranty for this information). If you have questions about a medical condition or this instruction, always ask your healthcare professional. [place] [address] any warranty or liability for your use of this information. Introducing [facility]! Dear Mari James: Thank you for requesting a Closetbox account. Our records indicate that you already have an active Closetbox account. You can access your account anytime at [email or web address] Did you know that you can access your hospital and ER discharge instructions at any time in Closetbox? You can also review all of your test results from your hospital stay or ER visit. Additional Information If you have questions, please visit the Frequently Asked Questions section of the Closetbox website at [email or web address] Remember, Closetbox is NOT to be used for urgent needs. For medical emergencies, dial 911. Now available from your iPhone and Android! Please provide this summary of care documentation to your next provider. Your primary care clinician is listed as Kit Chavarria.  If you have any questions after today's visit, please call [phone].

## 2017-09-20 NOTE — PROGRESS NOTES
Chief Complaint   Patient presents with   Anai Braden Annual Wellness Visit     1. Have you been to the ER, urgent care clinic since your last visit? Hospitalized since your last visit? No    2. Have you seen or consulted any other health care providers outside of the [address] [place] [address] since your last visit? Include any pap smears or colon screening. No      General Health Questions   -During the past 4 weeks:   -how would you rate your health in general? Fair   -how often have you been bothered by feeling dizzy when standing up? 2 times a week   -how much have you been bothered by bodily pain? not   -Have you noticed any hearing difficulties? no   -has your physical and emotional health limited your social activities with family or friends? no    Emotional Health Questions   -Do you have a history of depression, anxiety, or emotional problems? yes  -Over the past 2 weeks, have you felt down, depressed or hopeless? no  -Over the past 2 weeks, have you felt little interest or pleasure in doing things? no    Health Habits   Please describe your diet habits: 2  Meals a day  Do you get 5 servings of fruits or vegetables daily? no  Do you exercise regularly? yes    Activities of Daily Living and Functional Status   -Do you need help with eating, walking, dressing, bathing, toileting, the phone, transportation, shopping, preparing meals, housework, laundry, medications or managing money? no  -In the past four weeks, was someone available to help you if you needed and wanted help with anything? yes  -Are you confident are you that you can control and manage most of your health problems? yes  -Have you been given information to help you keep track of your medications? yes  -How often do you have trouble taking your medications as prescribed? never    Fall Risk and Home Safety   Have you fallen 2 or more times in the past year?  no  Does your home have rugs in the hallway, lack grab bars in the bathroom, lack handrails on the stairs or have poor lighting? no  Do you have smoke detectors and check them regularly?  yes  Do you have difficulties driving a car? no  Do you always fasten your seat belt when you are in a car? no

## 2017-09-21 LAB
ALBUMIN SERPL-MCNC: 4.1 G/DL (ref 3.5–4.8)
ALBUMIN/GLOB SERPL: 2 {RATIO} (ref 1.2–2.2)
ALP SERPL-CCNC: 67 IU/L (ref 39–117)
ALT SERPL-CCNC: 20 IU/L (ref 0–44)
AST SERPL-CCNC: 19 IU/L (ref 0–40)
BILIRUB SERPL-MCNC: 0.3 MG/DL (ref 0–1.2)
BUN SERPL-MCNC: 17 MG/DL (ref 8–27)
BUN/CREAT SERPL: 15 (ref 10–24)
CALCIUM SERPL-MCNC: 9.2 MG/DL (ref 8.6–10.2)
CHLORIDE SERPL-SCNC: 106 MMOL/L (ref 96–106)
CHOLEST SERPL-MCNC: 173 MG/DL (ref 100–199)
CO2 SERPL-SCNC: 21 MMOL/L (ref 18–29)
CREAT SERPL-MCNC: 1.13 MG/DL (ref 0.76–1.27)
ERYTHROCYTE [DISTWIDTH] IN BLOOD BY AUTOMATED COUNT: 13.2 % (ref 12.3–15.4)
GLOBULIN SER CALC-MCNC: 2.1 G/DL (ref 1.5–4.5)
GLUCOSE SERPL-MCNC: 117 MG/DL (ref 65–99)
HCT VFR BLD AUTO: 37 % (ref 37.5–51)
HDLC SERPL-MCNC: 58 MG/DL
HGB BLD-MCNC: 12.5 G/DL (ref 12.6–17.7)
INTERPRETATION, 910389: NORMAL
LDLC SERPL CALC-MCNC: 84 MG/DL (ref 0–99)
LITHIUM SERPL-SCNC: 1.1 MMOL/L (ref 0.6–1.2)
MCH RBC QN AUTO: 34.3 PG (ref 26.6–33)
MCHC RBC AUTO-ENTMCNC: 33.8 G/DL (ref 31.5–35.7)
MCV RBC AUTO: 102 FL (ref 79–97)
PLATELET # BLD AUTO: 208 X10E3/UL (ref 150–379)
POTASSIUM SERPL-SCNC: 5.4 MMOL/L (ref 3.5–5.2)
PROT SERPL-MCNC: 6.2 G/DL (ref 6–8.5)
RBC # BLD AUTO: 3.64 X10E6/UL (ref 4.14–5.8)
SODIUM SERPL-SCNC: 141 MMOL/L (ref 134–144)
TRIGL SERPL-MCNC: 156 MG/DL (ref 0–149)
VLDLC SERPL CALC-MCNC: 31 MG/DL (ref 5–40)
WBC # BLD AUTO: 6.6 X10E3/UL (ref 3.4–10.8)

## 2017-09-25 ENCOUNTER — HOSPITAL ENCOUNTER (OUTPATIENT)
Dept: CT IMAGING | Age: 72
Discharge: HOME | End: 2017-09-25
Attending: NURSE PRACTITIONER
Payer: MEDICARE

## 2017-09-25 DIAGNOSIS — R91.8 LUNG MASS: ICD-10-CM

## 2017-09-25 PROCEDURE — 71250 CT THORAX DX C-: CPT

## 2017-09-26 NOTE — PROGRESS NOTES
Progress Note    Patient: Jake Green MRN: [id number]  SSN: [id number]    YOB: 1945  Age: 71 y.o. Sex: male        Chief Complaint   Patient presents with    Annual Wellness Visit         Subjective:       Patient here for [address] Has 1 new complaints. Scalp lesion, present for weeks to months. Not painful. Currently growing slowly. No pus. Hasn't tried anything to make it better. Review of chart shows no recent tracking of lithium levels. His mood is stable. When he gets low he calls his wife and his therapist, both of whom are good sources of support for him. No current SI. Current and past medical information:    Current Medications after this visit[de-identified]     Current Outpatient Prescriptions   Medication Sig    clotrimazole-betamethasone (LOTRISONE) topical cream Apply  to affected area two (2) times a day.  rifAXIMin (XIFAXAN) 550 mg tablet Take 550 mg by mouth two (2) times a day.  OTHER Moringa oleifera 200mg daily    OTHER Kefir 118ml daily    donepezil (ARICEPT) 5 mg tablet TAKE 1 TABLET BY MOUTH NIGHTLY.  primidone (MYSOLINE) 50 mg tablet Take 1 Tab by mouth daily (with dinner).  omeprazole (PRILOSEC) 40 mg capsule Take 40 mg by mouth Daily (before breakfast).  COCONUT OIL PO Take  by mouth. 7.5 mL by mouth each morning, 7.5 mL at midday, 5 mL at dinner and 5 mL at bedtime    lithium carbonate 300 mg tablet Take 1 Tab by mouth two (2) times a day.  multivitamin (ONE A DAY) tablet Take 1 Tab by mouth every evening.  Aspirin, Buffered 81 mg tab Take 81 mg by mouth daily.  sertraline (ZOLOFT) 25 mg tablet Take 75 mg by mouth daily. No current facility-administered medications for this visit.         Patient Active Problem List    Diagnosis Date Noted    Nuclear cataract 11/21/2016    Elevated glucose 09/14/2016    Lipid disorder 09/14/2016    Thyroid nodule 09/13/2016    Healthcare maintenance 09/13/2016    Restless leg syndrome 03/21/2016    GERD (gastroesophageal reflux disease) 03/21/2016    Insomnia 03/07/2016    Diverticulitis 03/02/2016    Memory difficulties 10/26/2015    Hughes esophagus 10/05/2015    Anemia 09/02/2015    Urinary incontinence, stress, male 06/09/2015    [facility] 06/09/2015    Former smoker 11/24/2014    Colon polyps 08/04/2014    Pulmonary nodule 07/07/2014    COPD (chronic obstructive pulmonary disease) ([facility] 07/07/2014    Bipolar affective [facility] 07/01/2014    Essential tremor 07/01/2014    Nonspecific abnormal electrocardiogram (ECG) (EKG) 07/01/2014       Past Medical History:   Diagnosis Date    Alzheimer disease 11/2016    Hughes's esophagus with esophagitis     CAD (coronary artery disease)     minor calcification in heart    Chronic obstructive pulmonary disease (HCC)     mild    Diverticulitis     Essential tremor     [facility] 9/2004    prostatectomy    Psychiatric disorder     bipolar    Psychotic disorder     Restless leg syndrome        Allergies   Allergen Reactions    Sulfa (Sulfonamide Antibiotics) Nausea and Vomiting     Oral only       Past Surgical History:   Procedure Laterality Date    HX HEENT      deviated septum repair    HX PROSTATECTOMY  2004    HX TONSILLECTOMY         Social History     Social History    Marital status:      Spouse name: N/A    Number of children: N/A    Years of education: N/A     Social History Main Topics    Smoking status: Former Smoker     Packs/day: 1.00     Years: 50.00     Quit date: 9/18/2014    Smokeless tobacco: Never Used    Alcohol use No    Drug use: Yes     Special: Marijuana      Comment: 1 every evening    Sexual activity: Not Currently     Other Topics Concern    None     Social History Narrative       ROS      Objective:     Vitals:    09/20/17 1146   BP: 126/52   Pulse: (!) 58   Resp: 16   Temp: 96.2 °F (35.7 °C)   TempSrc: Oral   SpO2: 97%   Weight: 165 lb (74.8 kg)   Height: 5' 7\" (1.702 m)      Body mass index is 25.84 kg/(m^2). Physical Exam   Constitutional: He is oriented to person, place, and time. He appears well-developed and well-nourished. No distress. HENT:   Head: Normocephalic and atraumatic. Right Ear: External ear normal.   Left Ear: External ear normal.   Eyes: Conjunctivae are normal. Pupils are equal, round, and reactive to light. No scleral icterus. Neck: No thyromegaly present. Cardiovascular: Normal rate and regular rhythm. Exam reveals no gallop. No murmur heard. Pulmonary/Chest: Effort normal. No respiratory distress. He has no wheezes. He has no rales. Abdominal: Soft. He exhibits no distension. There is no tenderness. Musculoskeletal: He exhibits no edema, tenderness or deformity. Lymphadenopathy:     He has no cervical adenopathy. Neurological: He is alert and oriented to person, place, and time. Skin: No rash noted. He is not diaphoretic. No erythema. Raised, hard scalp lesion with crusting, 2cm in diameter, left of midline, medial to temporal bone   Psychiatric: He has a normal mood and affect. His behavior is normal.   Nursing note and vitals reviewed. Assessment and Plan:       ICD-10-CM ICD-9-CM    1. Encounter for immunization Z23 V03.89 INFLUENZA VIRUS VACCINE, HIGH DOSE SEASONAL, PRESERVATIVE FREE      ADMIN INFLUENZA VIRUS VAC   2. Healthcare maintenance Z00.00 V70.0 AMB POC FECAL BLOOD, OCCULT, QL 3 CARDS      METABOLIC PANEL, COMPREHENSIVE      CBC W/O DIFF      LIPID PANEL   3. Scalp lesion L98.9 709.9 REFERRAL TO DERMATOLOGY   4. Encounter for lithium monitoring Z51.81 V58.83 LITHIUM    Z79.899 V58.69    5. Medicare annual wellness visit, subsequent Z00.00 V70.0    6. Screening for alcoholism Z13.89 V79.1 ME ANNUAL ALCOHOL SCREEN 15 MIN     Screening, immunizations, and fecal as above for MWV    Scalp lesion referral, lithium monitoring for problem visit. Plan of care:  Discussed diagnoses in detail with patient. Medication risks/benefits/side effects discussed with patient. All of the patient's questions were addressed. The patient understands and agrees with our plan of care. The patient knows to call back if they are unsure of or forget any changes we discussed today or if the symptoms change. The patient received an After-Visit Summary which contains VS, orders, medication list and allergy list. This can be used as a \"mini-medical record\" should they have to seek medical care while out of town. Patient Care Team:  Davion Hernandez MD as PCP - General (Family Practice)  Cornelia Sandhoff, PsyD (Psychology)  Yury Simmons MD (Endocrinology)  Erich Villalobos MD (Psychiatry)  Efren Steward MD (Neurology)  Álvaro Graham MD (Gastroenterology)  Sandeep Bautista RN as Nurse Mehrdad Guzman MD as Physician (Sleep Medicine)    Follow-up Disposition: Not on File    No future appointments. Signed By: Jhoan Virgen MD     September 20, 2017      Medicare wellness questions:  General Health Questions   -During the past 4 weeks:                        -how would you rate your health in general? Fair                        -how often have you been bothered by feeling dizzy when standing up? 2 times a week                        -how much have you been bothered by bodily pain? not                        -Have you noticed any hearing difficulties? no                        -has your physical and emotional health limited your social activities with family or friends? no     Emotional Health Questions   -Do you have a history of depression, anxiety, or emotional problems? yes  -Over the past 2 weeks, have you felt down, depressed or hopeless? no  -Over the past 2 weeks, have you felt little interest or pleasure in doing things? no     Health Habits   Please describe your diet habits: 2  Meals a day  Do you get 5 servings of fruits or vegetables daily? no  Do you exercise regularly?  yes     Activities of Daily Living and Functional Status   -Do you need help with eating, walking, dressing, bathing, toileting, the phone, transportation, shopping, preparing meals, housework, laundry, medications or managing money? no  -In the past four weeks, was someone available to help you if you needed and wanted help with anything? yes  -Are you confident are you that you can control and manage most of your health problems? yes  -Have you been given information to help you keep track of your medications? yes  -How often do you have trouble taking your medications as prescribed? never     Fall Risk and Home Safety   Have you fallen 2 or more times in the past year? no  Does your home have rugs in the hallway, lack grab bars in the bathroom, lack handrails on the stairs or have poor lighting? no  Do you have smoke detectors and check them regularly?  yes  Do you have difficulties driving a car? no  Do you always fasten your seat belt when you are in a car? no

## 2017-10-02 ENCOUNTER — HOSPITAL ENCOUNTER (OUTPATIENT)
Dept: LAB | Age: 72
Discharge: HOME | End: 2017-10-02
Payer: MEDICARE

## 2017-10-02 PROCEDURE — 82270 OCCULT BLOOD FECES: CPT

## 2017-10-04 LAB — HEMOCCULT STL QL IA: NEGATIVE

## 2017-10-05 ENCOUNTER — OFFICE VISIT (OUTPATIENT)
Dept: DERMATOLOGY | Facility: AMBULATORY SURGERY CENTER | Age: 72
End: 2017-10-05

## 2017-10-05 ENCOUNTER — OFFICE VISIT (OUTPATIENT)
Dept: NEUROLOGY | Age: 72
End: 2017-10-05

## 2017-10-05 VITALS
HEIGHT: 67 IN | HEART RATE: 57 BPM | DIASTOLIC BLOOD PRESSURE: 78 MMHG | OXYGEN SATURATION: 98 % | BODY MASS INDEX: 25.88 KG/M2 | SYSTOLIC BLOOD PRESSURE: 132 MMHG | WEIGHT: 164.9 LBS | TEMPERATURE: 98.9 F | RESPIRATION RATE: 14 BRPM

## 2017-10-05 VITALS
HEIGHT: 67 IN | OXYGEN SATURATION: 98 % | HEART RATE: 52 BPM | BODY MASS INDEX: 25.9 KG/M2 | WEIGHT: 165 LBS | SYSTOLIC BLOOD PRESSURE: 90 MMHG | DIASTOLIC BLOOD PRESSURE: 52 MMHG

## 2017-10-05 DIAGNOSIS — L81.4 LENTIGINES: Primary | ICD-10-CM

## 2017-10-05 DIAGNOSIS — G25.0 ESSENTIAL TREMOR: ICD-10-CM

## 2017-10-05 DIAGNOSIS — R41.3 MEMORY DIFFICULTIES: Primary | ICD-10-CM

## 2017-10-05 DIAGNOSIS — G20 PARKINSON'S DISEASE (TREMOR, STIFFNESS, SLOW MOTION, UNSTABLE POSTURE) (HCC): ICD-10-CM

## 2017-10-05 DIAGNOSIS — L57.0 ACTINIC KERATOSIS: ICD-10-CM

## 2017-10-05 DIAGNOSIS — L82.1 OTHER SEBORRHEIC KERATOSIS: ICD-10-CM

## 2017-10-05 RX ORDER — CARBIDOPA AND LEVODOPA 25; 100 MG/1; MG/1
1 TABLET ORAL 3 TIMES DAILY
Qty: 90 TAB | Refills: 5 | Status: SHIPPED | OUTPATIENT
Start: 2017-10-05 | End: 2018-09-26

## 2017-10-05 RX ORDER — PRIMIDONE 50 MG/1
50 TABLET ORAL
Qty: 30 TAB | Refills: 6 | Status: SHIPPED | OUTPATIENT
Start: 2017-10-05 | End: 2018-06-06 | Stop reason: SDUPTHER

## 2017-10-05 NOTE — PROGRESS NOTES
Written by Phu Joseph, as dictated by Kayleigh Lopez, [address]. Name: Ruben Alvarado       Age: 71 y.o. Date: 10/5/2017    Chief Complaint:   Chief Complaint   Patient presents with    Skin Exam     growths on scalp       Subjective:    HPI:  Mr.. Ruben Alvarado is a 71 y.o. male who presents for the evaluation of lesions on the scalp. The patient was referred by Dr. Ramiro Douglas for this evaluation. He states that these lesions appeared 3-4 months ago. The patient has not had prior treatment for this lesion. Associated symptoms include occasional itching. He reports the use of coconut oil on these lesions with some relief. Reports he started balding in his 19's and tries to wear a cap often. ROS: Consitutional: Negative  Dermatological : positive for - skin lesion changes      Social History     Social History    Marital status:      Spouse name: N/A    Number of children: N/A    Years of education: N/A     Occupational History    Not on file.      Social History Main Topics    Smoking status: Former Smoker     Packs/day: 1.00     Years: 50.00     Quit date: 9/18/2014    Smokeless tobacco: Never Used    Alcohol use No    Drug use: Yes     Special: Marijuana      Comment: 1 every evening    Sexual activity: Not Currently     Other Topics Concern    Not on file     Social History Narrative       Family History   Problem Relation Age of Onset    Diabetes Father     Cancer Maternal Grandmother      stomach cancer    Heart Disease Maternal Grandmother     Stroke Maternal Grandmother     Cancer Other     Heart Disease Other        Past Medical History:   Diagnosis Date    Alzheimer disease 11/2016    Hughes's esophagus with esophagitis     CAD (coronary artery disease)     minor calcification in heart    Chronic obstructive pulmonary disease (HCC)     mild    Diverticulitis     Essential tremor     Family history of skin cancer     [facility] ([facility] 9/2004 prostatectomy    Psychiatric disorder     bipolar    Psychotic disorder     Restless leg syndrome        Past Surgical History:   Procedure Laterality Date    HX HEENT      deviated septum repair    HX PROSTATECTOMY  2004    HX TONSILLECTOMY         Current Outpatient Prescriptions   Medication Sig Dispense Refill    clotrimazole-betamethasone (LOTRISONE) topical cream Apply  to affected area two (2) times a day.  rifAXIMin (XIFAXAN) 550 mg tablet Take 550 mg by mouth two (2) times a day.  OTHER Moringa oleifera 200mg daily      OTHER Kefir 118ml daily      donepezil (ARICEPT) 5 mg tablet TAKE 1 TABLET BY MOUTH NIGHTLY. 30 Tab 11    sertraline (ZOLOFT) 25 mg tablet Take 75 mg by mouth daily.  primidone (MYSOLINE) 50 mg tablet Take 1 Tab by mouth daily (with dinner). 30 Tab 6    omeprazole (PRILOSEC) 40 mg capsule Take 40 mg by mouth Daily (before breakfast).  COCONUT OIL PO Take  by mouth. 7.5 mL by mouth each morning, 7.5 mL at midday, 5 mL at dinner and 5 mL at bedtime      lithium carbonate 300 mg tablet Take 1 Tab by mouth two (2) times a day. 60 Tab 11    multivitamin (ONE A DAY) tablet Take 1 Tab by mouth every evening.  Aspirin, Buffered 81 mg tab Take 81 mg by mouth daily. Allergies   Allergen Reactions    Sulfa (Sulfonamide Antibiotics) Nausea and Vomiting     Oral only         Objective: There were no vitals taken for this visit. Landy Stevens is a 71 y.o. male who appears well and in no distress. He is awake, alert, and oriented. A limited skin examination was completed including his scalp and right cheek. He has scaly lesions consistent with actinic keratoses on his scalp x5. He has scattered waxy macules and keratotic papules consistent with seborrheic keratoses. He has lentigines as well.       Assessment/Plan:    1. Actinic Keratoses, scalp. The diagnosis of this precancerous lesion related to sun exposure was reviewed.   Verbal consent was obtained. I treated 5 lesions with cryotherapy and post-cryotherapy care was reviewed. 2. Seborrheic keratoses, scalp. The diagnosis was reviewed and the patient was reassured that no treatment is needed for these benign lesions. 3. Solar lentigos, scalp. The diagnosis and relationship to sun exposure was reviewed. Sun protection advised. This plan was reviewed with the patient and patient agrees. All questions were answered. This scribe documentation was reviewed by me and accurately reflects the examination and decisions made by me. [facility]   OFFICE PROCEDURE PROGRESS NOTE   Chart reviewed for the following:   Shiv BENTON, have reviewed the History, Physical and updated the Allergic reactions for [employer]. TIME OUT performed immediately prior to start of procedure:   IKarrie, have performed the following reviews on [employer]   prior to the start of the procedure:     * Patient was identified by name and date of birth   * Agreement on procedure being performed was verified   * Risks and Benefits explained to the patient   * Procedure site verified and marked as necessary   * Patient was positioned for comfort   * Verbal consent was obtained    Time: 11:24 AM  Date of procedure: 10/5/2017  Procedure performed by: Marni Clemente.  Eugene Johnson DNP  Provider assisted by: self   Patient assisted by: self   How tolerated by patient: tolerated the procedure well with no complications   Comments: none

## 2017-10-05 NOTE — MR AVS SNAPSHOT
Visit Information Date & Time Provider Department Dept. Phone Encounter #  
 10/5/2017  3:20 PM Alma Aguila MD [facility] [phone] [id number] Follow-up Instructions Return in about 1 year (around 10/5/2018), or if symptoms worsen or fail to improve. Upcoming Health Maintenance Date Due  
 GLAUCOMA SCREENING Q2Y 9/13/2018 MEDICARE YEARLY EXAM 9/21/2018 FOBT Q 1 YEAR AGE 50-75 10/2/2018 DTaP/Tdap/Td series (2 - Td) 7/1/2024 Allergies as of 10/5/2017  Review Complete On: 10/5/2017 By: Alisa Lutz NP Severity Noted Reaction Type Reactions Sulfa (Sulfonamide Antibiotics)  07/01/2014    Nausea and Vomiting Oral only Current Immunizations  Reviewed on 9/20/2017 Name Date Influenza High Dose Vaccine PF 9/20/2017, 9/1/2016 Pneumococcal Conjugate (PCV-13) 7/28/2015 Pneumococcal Polysaccharide (PPSV-23) 7/1/2014 Tdap 7/1/2014 Varicella Virus Vaccine 7/28/2013 Not reviewed this visit You Were Diagnosed With   
  
 Codes Comments Memory difficulties    -  Primary ICD-10-CM: R41.3 ICD-9-CM: 780.93 Parkinson's disease (tremor, stiffness, slow motion, unstable posture) ([facility]     ICD-10-CM: G20 
ICD-9-CM: 332.0 Essential tremor     ICD-10-CM: G25.0 ICD-9-CM: 333.1 Vitals BP Pulse Height(growth percentile) Weight(growth percentile) SpO2 BMI  
 90/52 (!) 52 5' 7\" (1.702 m) 165 lb (74.8 kg) 98% 25.84 kg/m2 Smoking Status Former Smoker Vitals History BMI and BSA Data Body Mass Index Body Surface Area  
 25.84 kg/m 2 1.88 m 2 Preferred Pharmacy Pharmacy Name Phone [id number] - [place] [address] [place] [address] [phone] Your Updated Medication List  
  
   
This list is accurate as of: 10/5/17  4:44 PM.  Always use your most recent med list.  
  
  
  
  
 aspirin, buffered 81 mg Tab Take 81 mg by mouth daily. carbidopa-levodopa  mg per tablet Commonly known as:  SINEMET Take 1 Tab by mouth three (3) times daily. clotrimazole-betamethasone topical cream  
Commonly known as:  Rebekah Wray Apply  to affected area two (2) times a day. COCONUT OIL PO Take  by mouth. 7.5 mL by mouth each morning, 7.5 mL at midday, 5 mL at dinner and 5 mL at bedtime  
  
 donepezil 5 mg tablet Commonly known as:  ARICEPT  
TAKE 1 TABLET BY MOUTH NIGHTLY. lithium carbonate 300 mg tablet Take 1 Tab by mouth two (2) times a day. multivitamin tablet Commonly known as:  ONE A DAY Take 1 Tab by mouth every evening. omeprazole 40 mg capsule Commonly known as:  PRILOSEC Take 40 mg by mouth Daily (before breakfast). * OTHER Moringa oleifera 200mg daily * OTHER Kefir 118ml daily  
  
 primidone 50 mg tablet Commonly known as: MYSOLINE Take 1 Tab by mouth daily (with dinner). XIFAXAN 550 mg tablet Generic drug:  rifAXIMin Take 550 mg by mouth two (2) times a day. ZOLOFT 25 mg tablet Generic drug:  sertraline Take 75 mg by mouth daily. * Notice: This list has 2 medication(s) that are the same as other medications prescribed for you. Read the directions carefully, and ask your doctor or other care provider to review them with you. Prescriptions Sent to Pharmacy Refills  
 carbidopa-levodopa (SINEMET)  mg per tablet 5 Sig: Take 1 Tab by mouth three (3) times daily. Class: Normal  
 Pharmacy: [place] [id number], [address] [place] [address] Ph #: [phone] Route: Oral  
 primidone (MYSOLINE) 50 mg tablet 6 Sig: Take 1 Tab by mouth daily (with dinner). Class: Normal  
 Pharmacy: [place] [id number] [place] [address], [address] [place] [address] Ph #: [phone] Route: Oral  
  
Follow-up Instructions  Return in about 1 year (around 10/5/2018), or if symptoms worsen or fail to improve. Patient Instructions PRESCRIPTION REFILL POLICY [facility] Statement to Patients April 1, 2014 In an effort to ensure the large volume of patient prescription refills is processed in the most efficient and expeditious manner, we are asking our patients to assist us by calling your Pharmacy for all prescription refills, this will include also your  Mail Order Pharmacy. The pharmacy will contact our office electronically to continue the refill process. Please do not wait until the last minute to call your pharmacy. We need at least 48 hours (2days) to fill prescriptions. We also encourage you to call your pharmacy before going to  your prescription to make sure it is ready. With regard to controlled substance prescription refill requests (narcotic refills) that need to be picked up at our office, we ask your cooperation by providing us with at least 72 hours (3days) notice that you will need a refill. We will not refill narcotic prescription refill requests after 4:00pm on any weekday, Monday through Thursday, or after 2:00pm on Fridays, or on the weekends. We encourage everyone to explore another way of getting your prescription refill request processed using [employer], our patient web portal through our electronic medical record system. [employer] is an efficient and effective way to communicate your medication request directly to the office and  downloadable as an yaneth on your smart phone . [employer] also features a review functionality that allows you to view your medication list as well as leave messages for your physician. Are you ready to get connected? If so please review the attatched instructions or speak to any of our staff to get you set up right away! Thank you so much for your cooperation. Should you have any questions please contact our Practice Administrator. The Physicians and Staff,  [facility] A Healthy Lifestyle: Care Instructions Your Care Instructions A healthy lifestyle can help you feel good, stay at a healthy weight, and have plenty of energy for both work and play. A healthy lifestyle is something you can share with your whole family. A healthy lifestyle also can lower your risk for serious health problems, such as high blood pressure, heart disease, and diabetes. You can follow a few steps listed below to improve your health and the health of your family. Follow-up care is a key part of your treatment and safety. Be sure to make and go to all appointments, and call your doctor if you are having problems. Its also a good idea to know your test results and keep a list of the medicines you take. How can you care for yourself at home? · Do not eat too much sugar, fat, or fast foods. You can still have dessert and treats now and then. The goal is moderation. · Start small to improve your eating habits. Pay attention to portion sizes, drink less juice and soda pop, and eat more fruits and vegetables. ¨ Eat a healthy amount of food. A 3-ounce serving of meat, for example, is about the size of a deck of cards. Fill the rest of your plate with vegetables and whole grains. ¨ Limit the amount of soda and sports drinks you have every day. Drink more water when you are thirsty. ¨ Eat at least 5 servings of fruits and vegetables every day. It may seem like a lot, but it is not hard to reach this goal. A serving or helping is 1 piece of fruit, 1 cup of vegetables, or 2 cups of leafy, raw vegetables. Have an apple or some carrot sticks as an afternoon snack instead of a candy bar. Try to have fruits and/or vegetables at every meal. 
· Make exercise part of your daily routine. You may want to start with simple activities, such as walking, bicycling, or slow swimming. Try to be active 30 to 60 minutes every day.  You do not need to do all 30 to 60 minutes all at once. For example, you can exercise 3 times a day for 10 or 20 minutes. Moderate exercise is safe for most people, but it is always a good idea to talk to your doctor before starting an exercise program. 
· Keep moving. [place] the lawn, work in the garden, or [employer]. Take the stairs instead of the elevator at work. · If you smoke, quit. People who smoke have an increased risk for heart attack, stroke, cancer, and other lung illnesses. Quitting is hard, but there are ways to boost your chance of quitting tobacco for good. ¨ Use nicotine gum, patches, or lozenges. ¨ Ask your doctor about stop-smoking programs and medicines. ¨ Keep trying. In addition to reducing your risk of diseases in the future, you will notice some benefits soon after you stop using tobacco. If you have shortness of breath or asthma symptoms, they will likely get better within a few weeks after you quit. · Limit how much alcohol you drink. Moderate amounts of alcohol (up to 2 drinks a day for men, 1 drink a day for women) are okay. But drinking too much can lead to liver problems, high blood pressure, and other health problems. Family health If you have a family, there are many things you can do together to improve your health. · Eat meals together as a family as often as possible. · Eat healthy foods. This includes fruits, vegetables, lean meats and dairy, and whole grains. · Include your family in your fitness plan. Most people think of activities such as jogging or tennis as the way to fitness, but there are many ways you and your family can be more active. Anything that makes you breathe hard and gets your heart pumping is exercise. Here are some tips: 
¨ Walk to do errands or to take your child to school or the bus. ¨ Go for a family bike ride after dinner instead of watching TV. Where can you learn more? Go to http://rowena-jalen.info/. Enter H619 in the search box to learn more about \"A Healthy Lifestyle: Care Instructions. \" Current as of: July 26, 2016 Content Version: 11.3 © 9998-1751 "[employer]. Care instructions adapted under license by [employer] (which disclaims liability or warranty for this information). If you have questions about a medical condition or this instruction, always ask your healthcare professional. [address] any warranty or liability for your use of this information. Introducing [facility]! Dear Alden Mccarty: Thank you for requesting a [employer] account. Our records indicate that you already have an active [employer] account. You can access your account anytime at [email or web address]. [email or web address] Did you know that you can access your hospital and ER discharge instructions at any time in [employer]? You can also review all of your test results from your hospital stay or ER visit. Additional Information If you have questions, please visit the Frequently Asked Questions section of the [employer] website at [email or web address] Remember, [employer] is NOT to be used for urgent needs. For medical emergencies, dial 911. Now available from your iPhone and Android! Please provide this summary of care documentation to your next provider. Your primary care clinician is listed as Nelly Ramsey. If you have any questions after today's visit, please call [phone].

## 2017-10-12 NOTE — PROGRESS NOTES
Neuro Progress Note    Name Yovani Nova Age 71 y.o. MRN [id number]  1945         Date of Note:  2017    Yovani Nova returns for a follow up visit. His restless leg syndrome is no longer an issue. He continues to be on a small dose of donepezil however we agreed that a diagnosis of alzheimer's is improbable at this point especially since there has been little progression. His tremor has worsened. Latest lithum level was within normal range of reference. He has had no falls. .     Allergies  Sulfa (sulfonamide antibiotics)    Current Outpatient Prescriptions   Medication Sig    primidone (MYSOLINE) 50 mg tablet Take 1 Tab by mouth daily (with dinner).  donepezil (ARICEPT) 5 mg tablet TAKE 1 TAB BY MOUTH NIGHTLY.  omeprazole (PRILOSEC) 40 mg capsule Take 40 mg by mouth Daily (before breakfast).  COCONUT OIL PO Take  by mouth. 7.5 mL by mouth each morning, 7.5 mL at midday, 5 mL at dinner and 5 mL at bedtime    buPROPion (WELLBUTRIN) 100 mg tablet Take 1 Tab by mouth daily.  lithium carbonate 300 mg tablet Take 1 Tab by mouth two (2) times a day.  multivitamin (ONE A DAY) tablet Take 1 Tab by mouth every evening.  Aspirin, Buffered 81 mg tab Take 81 mg by mouth daily. No current facility-administered medications for this visit. Past Medical History   Diagnosis Date    Alzheimer disease 2016    Hughes's esophagus with esophagitis     CAD (coronary artery disease)      minor calcification in heart    Chronic obstructive pulmonary disease (HCC)      mild    Diverticulitis     Essential tremor     Prostate cancer ([facility] 2004     prostatectomy    Psychiatric disorder      bipolar    Psychotic disorder     Restless leg syndrome         Review of Systems   Constitutional: Negative for chills and fever. HENT: Negative for ear pain. Eyes: Negative for pain and discharge. Respiratory: Negative for cough and hemoptysis.     Cardiovascular: Negative for chest pain and claudication. Gastrointestinal: Negative for constipation and diarrhea. Genitourinary: Negative for flank pain and hematuria. Musculoskeletal: Negative for back pain and myalgias. Skin: Negative for itching and rash. Neurological: Positive for tremors. Negative for headaches. Endo/Heme/Allergies: Negative for environmental allergies. Does not bruise/bleed easily. Psychiatric/Behavioral: Negative for depression and hallucinations. The patient is nervous/anxious. Exam:     Visit Vitals    /60    Pulse (!) 58    Wt 164 lb 3.2 oz (74.5 kg)    SpO2 98%    BMI 25.72 kg/m2        General: Well developed, well nourished. Patient in no apparent distress   Head: Normocephalic, atraumatic, anicteric sclera   Lungs:  Clear to auscultation bilaterally, No wheezes or rubs   Cardiac: Regular rate and rhythm with no murmurs. Abd: Bowel sounds were audible. No tenderness on palpation   Ext: No pedal edema   Skin: No overt signs of rash or insect bites     Neurological Exam:    Patient is awake and alert. Speech is fluent. Oriented to person place and time. Cranial nerves II-XII are intact with no perceived deficits. Muscle strength is symmetric with no lateralizing weakness. Tone has cog wheeling and increased and what appears to be a resting tremor. Reflexes are symmetrically 2+ over the major tendons. Sensory exam is symmetric and intact in all modalities. Gait is well balanced. Assessment & Plan     1. Essential tremor vs parkinson's tremor  Trial of sinemet    2. Minimal cognitive impairment  Continue aricept  Schedule for a neuropsyche examination     3.  Restless Legs  resolved with accupunture    Patient Active Problem List   Diagnosis Code    Bipolar affective ([facility] F31.9    Essential tremor G25.0    Nonspecific abnormal electrocardiogram (ECG) (EKG) R94.31    Pulmonary nodule R91.1    COPD (chronic obstructive pulmonary disease) ([facility] J44.9    Colon polyps K63.5    Former smoker Z87.891    Urinary incontinence, stress, male N39.3    [facility] C61    Anemia D64.9    Hughes esophagus K22.70    Memory difficulties R41.3    Diverticulitis K57.92    Insomnia G47.00    Restless leg syndrome G25.81    GERD (gastroesophageal reflux disease) K21.9    Thyroid nodule E04.1    Healthcare maintenance Z00.00    Elevated glucose R73.09    Lipid disorder E78.9

## 2017-10-23 ENCOUNTER — TELEPHONE (OUTPATIENT)
Dept: NEUROLOGY | Age: 72
End: 2017-10-23

## 2017-10-23 NOTE — TELEPHONE ENCOUNTER
Gus Can  Male, 71 y.o., 1945  Weight:   165 lb (74.8 kg)  Phone:   E:[phone]  PCP:   Eyad Tavares MD  MRN:   [id number]  MyChart: Active  Next Appt:   10/05/2018    RE:MyChart After Visit Follow-Up Message.        From     Willy Holman      To     [facility]      Sent     10/23/2017 11:14 AM            After about a week of use, carbidopa-levadopa was clearly making things worse: number and intensity of tremors increased in both hands. Returned to primidone, things calmed down.              Previous Messages       ----- Message -----   From: Vinay Hernández MD   Sent: 10/19/2017 12:00 AM EDT   To: Santhosh RothLoan Manda   Subject: MyChart After Visit Follow-Up Message. Park Holman,     Thank you for your recent visit to Neurology [address] Office. Your health is important to us and we are privileged to be your healthcare provider and partner. Lázaro Ball you have follow-up questions regarding your treatment plan from todays visit, please contact us through the [employer] Patient Portal by replying to this message. In the near future, you may receive a survey about your experience with us.  We hope you will take the time to let us know how we did so we can continue to improve the care you receive.        Thank you,     [facility]                          Encounter Messages        Read Composed From To Subject       Y 10/23/2017 11:14 AM Santhosh Hernández MD RE:MyCjoycet After Visit Follow-Up Message.       Y 10/16/2017  9:04 AM MD Yovani To After Visit Follow-Up Message.       Y 10/2/2017 10:35 AM Momo Blake MD History       Y 9/26/2017  1:48 PM Raul Cruz Appointment Scheduled

## 2018-06-06 DIAGNOSIS — G25.0 ESSENTIAL TREMOR: ICD-10-CM

## 2018-06-07 ENCOUNTER — OFFICE VISIT (OUTPATIENT)
Dept: NEUROLOGY | Age: 73
End: 2018-06-07

## 2018-06-07 DIAGNOSIS — F12.90 USES MARIJUANA: ICD-10-CM

## 2018-06-07 DIAGNOSIS — R41.3 SHORT-TERM MEMORY LOSS: ICD-10-CM

## 2018-06-07 DIAGNOSIS — F33.0 DEPRESSION, MAJOR, RECURRENT, MILD (HCC): ICD-10-CM

## 2018-06-07 DIAGNOSIS — F32.A ANXIETY AND DEPRESSION: ICD-10-CM

## 2018-06-07 DIAGNOSIS — Z86.59 HISTORY OF BIPOLAR DISORDER: ICD-10-CM

## 2018-06-07 DIAGNOSIS — G31.84 MINIMAL COGNITIVE IMPAIRMENT: Primary | ICD-10-CM

## 2018-06-07 DIAGNOSIS — G31.84 MILD COGNITIVE IMPAIRMENT: Primary | ICD-10-CM

## 2018-06-07 DIAGNOSIS — F41.9 ANXIETY AND DEPRESSION: ICD-10-CM

## 2018-06-07 NOTE — PROGRESS NOTES
[address] [facility],[address]  [address]   [place] [address] [facility] [address] [facility]   [address]   [phone] Office   [phone] Fax      Neuropsychology    Exam # 3    Initial Diagnostic Interview Note      Referral:  Shirley Bustos MD    Shayna Lyon is a 71 y.o. right handed   male who was accompanied by his spouse to the initial clinical interview on 6/7/18 . Please refer to his medical records for details pertaining to his history. Well known patient to me. When I saw him last: Shayna Lyon is a 80 y.o. right handed  male who was accompanied to the initial clinical interview on 2/2/17 . Please refer to his medical records for details pertaining to his history. When I saw him last: Shayna Lyon is a 72 y.o. right handed   male who was accompanied by his spouse to the initial clinical interview on 8/18/15. Please refer to his medical records for details pertaining to his history. He has a PhD in Philosophy and denied any history of previously diagnosed LD and/or receipt of special education services. 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 He has noticed that he has more difficulties remembering names and difficulties coming up with words. Thought process slower. Slower to process information. Used to be very good at works. Mild short term memory issues on the day-to-day basis. More difficulties following numbers. He has a harder time transcribing messages on the phone. Took two phone numbers down and spouse had found that he had made mistakes on both of the phone numbers. Trouble transposing numbers at times on paper. Hears something but writes something different. No previous history of ADD/ADHD issues. The memory problems have started to get worse over the past two years or so. Recognizes that everybody's memory starts to fade, but this is faster.  Something does not feel normal. Will struggle when reading instructions and following them. Used to be very good at following these directions. Spouse has to double check things he does, which frustrates him. Missing details. No major known neurologic history, including no recent TBI, meningitis/encephalitis, ATIYA Fever, Lupus, Lyme, CVA, sz, toxic chemical exposure requiring treatment, etc. Used to be able to find his way back from a new place when driving, now cannot. He is independent for medications. No problems paying the bills. Day-to-day chores okay. Sleep varies. Appetite is fine. Balance is less good now than it was previously. No falls, but trips, and slides into walls. Gross motor coordination not as good. Fine motor coordination was never good. No changes in sense of taste or smell. No changes in libido. Is on Lithium right now. Not feeling much help with antidepressant but concern is that if those meds are upped he may become manic. For relaxation, he enjoys woodworking, watching porn occasionally. His father - the last year or two of his life had dementia, he passed away at 80. His mother may have dementia now, she is 80.      My diagnostic impressions at that time included:     This patient generated a mixed normal/abnormal range Neuropsychological Evaluation with respect to neurocognitive functioning. In this regard, he is showing problems with verbal fluency, sustained attention, auditory learning, auditory memory, and bilateral motor skills. His IQ is within the very superior range and his performance across all other neurocognitive domains assessed, including mental status, confrontation naming, processing speed, working memory, verbal comprehension, perceptual reasoning, and executive functioning remain normal. Emotionally, there is support for both depression and anxiety .  He reports being diagnosed with bipolar disorder in the past but that was not seen during this evaluation.       In my opinion, this profile is consistent with an evolving organic process that is currently at a mild level of severity. It is masked to some degree by his excellent IQ and verbal skills (and other neurocognitive domain strengths listed above). AD likely. The profile is inconsistent with pseudodementia, but mood issues will exacerbate. I suggest consideration for memory management medication, medication for attention, and a review of his current psychiatric medication management for anxiety and depression if this/these are not medically contraindicated. Mental health counseling is strongly advised and his risk for self-harm needs to be monitored by his mental health care providers. The patient should be encouraged to remain as mentally, socially, and physically active as possible.       The patient's generated cognitive difficulties are significant to the degree whereby it is my opinion that he should not live independently without appropriate supervision for medications and finances. While competent, he also likely needs assistance with high level decision making. He can assign a POA if this has not been done so already. Marked problems with attention and reaction time raise concern regarding driving safety, and I suggest consideration for a formal evaluation of same. This issue has been caught early on, and I hope that he and the family recognize this as positive. Baseline now established. Follow up prn. Clinical correlation is, of course, indicated.      I will discuss these findings with the patient and family when they follow up with me in the near future. A follow up Neuropsychological Evaluation is indicated on a prn basis.     DIAGNOSES: Dementia Without Behavioral Disturbance (Mild)   Anxiety - Mild  Depression - Mild  Hx of Bipolar Disorder     Since I saw him last, the patient reported that, compared to when he was tested last time, he has noticed an improvement in his memory.   Spouse says he struggles with names, telephone numbers, and dates. Continues to sometime forget the content of conversations. Keeps a journal, and will watch a movie in the evening, and next morning writes in his journal, and 75% of the time he has to go to the listing of the movie to remember the name of the movie. He is on Aricept. Also on medication for mood (see below). He had a bout of depression in October which lasted persistently. Is being followed by Psychiatry. Switched to Zoloft (from buproprion). Depression lifted last week. Sees Dr. Aixa Morales. No new neurologic issues since I saw him last.  Spouse does say that he is better, is not as foggy, but memory issues do persist.  Independent for driving, medications, finances, day-to-day chores, etc.  Sleep is poor/worse. Has always had insomnia and it has gotten worse. Appetite is okay. No falls. Tried acupuncture. Mood is better as depression lifted last year. Can look things up on Google, directions, etc.       Diagnostic impressions then included: This patient again generated a mixed normal/abnormal range Neuropsychological Evaluation with respect to neurocognitive functioning. In this regard, he is showing problems withsustained attention, auditory learning, auditory memory, and bilateral motor skills. His IQ remains within the superior range. Memory has improved significantly since previous testing. His performance across all other neurocognitive domains assessed, including mental status, confrontation naming, processing speed, working memory, verbal comprehension, perceptual reasoning, and executive functioning remain normal. Emotionally, there is support for both depression and anxiety, but both have improved since previous testing. I again do not see evidence of bipolar.                             In my opinion, dementia is quite stable and memory scores have improved significantly, though the spouse reports that mild issues continue. He is much less \"foggy\".   He does have selected areas of ongoing neurocognitive difficulties despite an improvement and mood. It may be wise to keep him on Aricept but I wonder if his mood continues to improve than at next testing his memory scores may improve to normal?  The profile remains inconsistent with a pseudodementia, and it is also noted that his attention has declined over time. Thus, I also recommend treatment for attention issues and ongoing psychiatric treatment and counseling for depression and anxiety. I am not concerned about competency, driving, day-to-day supervision (except for online billpay and using a pillbox), etc.  I hope he is relieved by this very positive news of improvement. The patient should be encouraged to remain as mentally, socially, and physically active as possible. I would like to see him again as soon as insurance will allow (six to 12 months) to track changes. We now have baseline and updated data on him. Clinical correlation is advised.        I will discuss these findings with the patient and family when they follow up with me in the near future. A follow up Neuropsychological Evaluation is indicated on a prn basis.     DIAGNOSES: MCI versus mild/atypical dementia                         Anxiety - (Improved)                        Depression - (Improved)                        Hx of Bipolar Disorder    Since I last saw him, the patient reported that his memory has been declining. He can't remember things right away, but may later pick it up (not all the time). He has not noticed any difference in terms of other cognitive abilities. Can still read, watch tv and follow the plots, fix things. He has had a change in medication over the past year, and now he can't remember what he had been taking, but is now taking Zoloft. Previous medication was buproprion. Spouse concurs, and wonders about how much this is age related. Anxiety and depression issues about the same, per the patient.   Spouse notes that for awhile, mood had improved, until last three months. He has been under quite a bit of stress. They were travelling in [facility] for 9 weeks by themselves. That was very stressful for them. Half the itinerary was planned and half was on the cuff. Stayed at [facility]. No new neurologic issues  Saw his therapist - and they noted he was doing better also. Psychiatrist also said the same thing about him looking and oing better. Appetite varies. No acute or focal issues. Neuropsychological Mental Status Exam (NMSE):  Historian: Good  Praxis: No UE apraxia  R/L Orientation: Intact to self and to other  Dress: within normal limits   Weight: within normal limits   Appearance/Hygiene: within normal limits   Gait: within normal limits   Assistive Devices Glasses  Mood: within normal limits   Affect: within normal limits   Comprehension: within normal limits   Thought Process: within normal limits   Expressive Language: within normal limits   Receptive Language: within normal limits   Motor:  No cognitive or motor perseveration  ETOH: Denied  Tobacco: Denied  Illicit: Marijuana, which continues to be helpful. No others.     SI/HI: Denied  Psychosis: Denied  Insight: Within normal limits  Judgment: Within normal limits  Other Psych:      Past Medical History:   Diagnosis Date    Alzheimer disease 11/2016    Hughes's esophagus with esophagitis     CAD (coronary artery disease)     minor calcification in heart    Chronic obstructive pulmonary disease (HCC)     mild    Diverticulitis     Essential tremor     Family history of skin cancer     Prostate cancer ([facility] [address] 9/2004    prostatectomy    Psychiatric disorder     bipolar    Psychotic disorder     Restless leg syndrome        Past Surgical History:   Procedure Laterality Date    HX HEENT      deviated septum repair    HX PROSTATECTOMY  2004    HX TONSILLECTOMY         Allergies   Allergen Reactions    Sulfa (Sulfonamide Antibiotics) Nausea and Vomiting     Oral only Family History   Problem Relation Age of Onset    Diabetes Father     Cancer Maternal Grandmother      stomach cancer    Heart Disease Maternal Grandmother     Stroke Maternal Grandmother     Cancer Other     Heart Disease Other        Social History   Substance Use Topics    Smoking status: Former Smoker     Packs/day: 1.00     Years: 50.00     Quit date: 9/18/2014    Smokeless tobacco: Never Used    Alcohol use No       Current Outpatient Prescriptions   Medication Sig Dispense Refill    carbidopa-levodopa (SINEMET)  mg per tablet Take 1 Tab by mouth three (3) times daily. 90 Tab 5    primidone (MYSOLINE) 50 mg tablet Take 1 Tab by mouth daily (with dinner). 30 Tab 6    clotrimazole-betamethasone (LOTRISONE) topical cream Apply  to affected area two (2) times a day.  rifAXIMin (XIFAXAN) 550 mg tablet Take 550 mg by mouth two (2) times a day.  OTHER Moringa oleifera 200mg daily      OTHER Kefir 118ml daily      donepezil (ARICEPT) 5 mg tablet TAKE 1 TABLET BY MOUTH NIGHTLY. 30 Tab 11    sertraline (ZOLOFT) 25 mg tablet Take 75 mg by mouth daily.  omeprazole (PRILOSEC) 40 mg capsule Take 40 mg by mouth Daily (before breakfast).  COCONUT OIL PO Take  by mouth. 7.5 mL by mouth each morning, 7.5 mL at midday, 5 mL at dinner and 5 mL at bedtime      lithium carbonate 300 mg tablet Take 1 Tab by mouth two (2) times a day. 60 Tab 11    multivitamin (ONE A DAY) tablet Take 1 Tab by mouth every evening.  Aspirin, Buffered 81 mg tab Take 81 mg by mouth daily. Plan:  Obtain authorization for testing from insurance company. Report to follow once testing, scoring, and interpretation completed. ? Organic based neurocognitive issues versus mood disorder or combination of same. ? Problems organic, functional, or both? This note will not be viewable in [address] Patient here for third neuropsych. First exam showed mild dementia. Second exam was much improved. Now here for third with cognitive and emotional issues as noted aove. Atypical case. Will eval and compare/contrast from previous.

## 2018-06-08 NOTE — PROGRESS NOTES
[address] [facility],[address]  [address]   [place] [address] [facility]   Lou Willett    [phone] Office   [phone] Fax      Neuropsychological Evaluation Report    Exam # [id number]  Referral:  Javier Taylor MD    Alvaro Antoine is a 71 y.o. right handed   male who was accompanied by his spouse to the initial clinical interview on 6/7/18 . Please refer to his medical records for details pertaining to his history. Well known patient to me. When I saw him last: Alvaro Antoine is a 80 y.o. right handed  male who was accompanied to the initial clinical interview on 2/2/17 . Please refer to his medical records for details pertaining to his history. When I saw him last: Alvaro Antoine is a 72 y.o. right handed   male who was accompanied by his spouse to the initial clinical interview on 8/18/15. Please refer to his medical records for details pertaining to his history. He has a PhD in Philosophy and denied any history of previously diagnosed LD and/or receipt of special education services. 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 He has noticed that he has more difficulties remembering names and difficulties coming up with words. Thought process slower. Slower to process information. Used to be very good at works. Mild short term memory issues on the day-to-day basis. More difficulties following numbers. He has a harder time transcribing messages on the phone. Took two phone numbers down and spouse had found that he had made mistakes on both of the phone numbers. Trouble transposing numbers at times on paper. Hears something but writes something different. No previous history of ADD/ADHD issues. The memory problems have started to get worse over the past two years or so. Recognizes that everybody's memory starts to fade, but this is faster.  Something does not feel normal. Will struggle when reading instructions and following them. Used to be very good at following these directions. Spouse has to double check things he does, which frustrates him. Missing details. No major known neurologic history, including no recent TBI, meningitis/encephalitis, ATIYA Fever, Lupus, Lyme, CVA, sz, toxic chemical exposure requiring treatment, etc. Used to be able to find his way back from a new place when driving, now cannot. He is independent for medications. No problems paying the bills. Day-to-day chores okay. Sleep varies. Appetite is fine. Balance is less good now than it was previously. No falls, but trips, and slides into walls. Gross motor coordination not as good. Fine motor coordination was never good. No changes in sense of taste or smell. No changes in libido. Is on Lithium right now. Not feeling much help with antidepressant but concern is that if those meds are upped he may become manic. For relaxation, he enjoys woodworking, watching porn occasionally. His father - the last year or two of his life had dementia, he passed away at 80. His mother may have dementia now, she is 80.      My diagnostic impressions at that time included:     This patient generated a mixed normal/abnormal range Neuropsychological Evaluation with respect to neurocognitive functioning. In this regard, he is showing problems with verbal fluency, sustained attention, auditory learning, auditory memory, and bilateral motor skills. His IQ is within the very superior range and his performance across all other neurocognitive domains assessed, including mental status, confrontation naming, processing speed, working memory, verbal comprehension, perceptual reasoning, and executive functioning remain normal. Emotionally, there is support for both depression and anxiety .  He reports being diagnosed with bipolar disorder in the past but that was not seen during this evaluation.       In my opinion, this profile is consistent with an evolving organic process that is currently at a mild level of severity. It is masked to some degree by his excellent IQ and verbal skills (and other neurocognitive domain strengths listed above). AD likely. The profile is inconsistent with pseudodementia, but mood issues will exacerbate. I suggest consideration for memory management medication, medication for attention, and a review of his current psychiatric medication management for anxiety and depression if this/these are not medically contraindicated. Mental health counseling is strongly advised and his risk for self-harm needs to be monitored by his mental health care providers. The patient should be encouraged to remain as mentally, socially, and physically active as possible.       The patient's generated cognitive difficulties are significant to the degree whereby it is my opinion that he should not live independently without appropriate supervision for medications and finances. While competent, he also likely needs assistance with high level decision making. He can assign a POA if this has not been done so already. Marked problems with attention and reaction time raise concern regarding driving safety, and I suggest consideration for a formal evaluation of same. This issue has been caught early on, and I hope that he and the family recognize this as positive. Baseline now established. Follow up prn. Clinical correlation is, of course, indicated.      I will discuss these findings with the patient and family when they follow up with me in the near future. A follow up Neuropsychological Evaluation is indicated on a prn basis.     DIAGNOSES: Dementia Without Behavioral Disturbance (Mild)   Anxiety - Mild  Depression - Mild  Hx of Bipolar Disorder     Since I saw him last, the patient reported that, compared to when he was tested last time, he has noticed an improvement in his memory. Spouse says he struggles with names, telephone numbers, and dates.   Continues to sometime forget the content of conversations. Keeps a journal, and will watch a movie in the evening, and next morning writes in his journal, and 75% of the time he has to go to the listing of the movie to remember the name of the movie. He is on Aricept. Also on medication for mood (see below). He had a bout of depression in October which lasted persistently. Is being followed by Psychiatry. Switched to Zoloft (from buproprion). Depression lifted last week. Sees Dr. Marquise Bernard. No new neurologic issues since I saw him last.  Spouse does say that he is better, is not as foggy, but memory issues do persist.  Independent for driving, medications, finances, day-to-day chores, etc.  Sleep is poor/worse. Has always had insomnia and it has gotten worse. Appetite is okay. No falls. Tried acupuncture. Mood is better as depression lifted last year. Can look things up on Google, directions, etc.       Diagnostic impressions then included: This patient again generated a mixed normal/abnormal range Neuropsychological Evaluation with respect to neurocognitive functioning. In this regard, he is showing problems withsustained attention, auditory learning, auditory memory, and bilateral motor skills. His IQ remains within the superior range. Memory has improved significantly since previous testing. His performance across all other neurocognitive domains assessed, including mental status, confrontation naming, processing speed, working memory, verbal comprehension, perceptual reasoning, and executive functioning remain normal. Emotionally, there is support for both depression and anxiety, but both have improved since previous testing. I again do not see evidence of bipolar.                             In my opinion, dementia is quite stable and memory scores have improved significantly, though the spouse reports that mild issues continue. He is much less \"foggy\".   He does have selected areas of ongoing neurocognitive difficulties despite an improvement and mood. It may be wise to keep him on Aricept but I wonder if his mood continues to improve than at next testing his memory scores may improve to normal?  The profile remains inconsistent with a pseudodementia, and it is also noted that his attention has declined over time. Thus, I also recommend treatment for attention issues and ongoing psychiatric treatment and counseling for depression and anxiety. I am not concerned about competency, driving, day-to-day supervision (except for online billpay and using a pillbox), etc.  I hope he is relieved by this very positive news of improvement. The patient should be encouraged to remain as mentally, socially, and physically active as possible. I would like to see him again as soon as insurance will allow (six to 12 months) to track changes. We now have baseline and updated data on him. Clinical correlation is advised.        I will discuss these findings with the patient and family when they follow up with me in the near future. A follow up Neuropsychological Evaluation is indicated on a prn basis.     DIAGNOSES: MCI versus mild/atypical dementia                         Anxiety - (Improved)                        Depression - (Improved)                        Hx of Bipolar Disorder    Since I last saw him, the patient reported that his memory has been declining. He can't remember things right away, but may later pick it up (not all the time). He has not noticed any difference in terms of other cognitive abilities. Can still read, watch tv and follow the plots, fix things. He has had a change in medication over the past year, and now he can't remember what he had been taking, but is now taking Zoloft. Previous medication was buproprion. Spouse concurs, and wonders about how much this is age related. Anxiety and depression issues about the same, per the patient.   Spouse notes that for awhile, mood had improved, until last three months. He has been under quite a bit of stress. They were travelling in [facility] for 9 weeks by themselves. That was very stressful for them. Half the itinerary was planned and half was on the cuff. Stayed at [facility]. No new neurologic issues  Saw his therapist - and they noted he was doing better also. Psychiatrist also said the same thing about him looking and oing better. Appetite varies. No acute or focal issues. Neuropsychological Mental Status Exam (NMSE):  Historian: Good  Praxis: No UE apraxia  R/L Orientation: Intact to self and to other  Dress: within normal limits   Weight: within normal limits   Appearance/Hygiene: within normal limits   Gait: within normal limits   Assistive Devices Glasses  Mood: within normal limits   Affect: within normal limits   Comprehension: within normal limits   Thought Process: within normal limits   Expressive Language: within normal limits   Receptive Language: within normal limits   Motor:  No cognitive or motor perseveration  ETOH: Denied  Tobacco: Denied  Illicit: Marijuana, which continues to be helpful. No others.     SI/HI: Denied  Psychosis: Denied  Insight: Within normal limits  Judgment: Within normal limits  Other Psych:      Past Medical History:   Diagnosis Date    Alzheimer disease 11/2016    Hughes's esophagus with esophagitis     CAD (coronary artery disease)     minor calcification in heart    Chronic obstructive pulmonary disease (HCC)     mild    Diverticulitis     Essential tremor     Family history of skin cancer     Prostate cancer ([facility] [address] 9/2004    prostatectomy    Psychiatric disorder     bipolar    Psychotic disorder     Restless leg syndrome        Past Surgical History:   Procedure Laterality Date    HX HEENT      deviated septum repair    HX PROSTATECTOMY  2004    HX TONSILLECTOMY         Allergies   Allergen Reactions    Sulfa (Sulfonamide Antibiotics) Nausea and Vomiting     Oral only       Family History Problem Relation Age of Onset    Diabetes Father     Cancer Maternal Grandmother      stomach cancer    Heart Disease Maternal Grandmother     Stroke Maternal Grandmother     Cancer Other     Heart Disease Other        Social History   Substance Use Topics    Smoking status: Former Smoker     Packs/day: 1.00     Years: 50.00     Quit date: 9/18/2014    Smokeless tobacco: Never Used    Alcohol use No       Current Outpatient Prescriptions   Medication Sig Dispense Refill    carbidopa-levodopa (SINEMET)  mg per tablet Take 1 Tab by mouth three (3) times daily. 90 Tab 5    primidone (MYSOLINE) 50 mg tablet Take 1 Tab by mouth daily (with dinner). 30 Tab 6    clotrimazole-betamethasone (LOTRISONE) topical cream Apply  to affected area two (2) times a day.  rifAXIMin (XIFAXAN) 550 mg tablet Take 550 mg by mouth two (2) times a day.  OTHER Moringa oleifera 200mg daily      OTHER Kefir 118ml daily      donepezil (ARICEPT) 5 mg tablet TAKE 1 TABLET BY MOUTH NIGHTLY. 30 Tab 11    sertraline (ZOLOFT) 25 mg tablet Take 75 mg by mouth daily.  omeprazole (PRILOSEC) 40 mg capsule Take 40 mg by mouth Daily (before breakfast).  COCONUT OIL PO Take  by mouth. 7.5 mL by mouth each morning, 7.5 mL at midday, 5 mL at dinner and 5 mL at bedtime      lithium carbonate 300 mg tablet Take 1 Tab by mouth two (2) times a day. 60 Tab 11    multivitamin (ONE A DAY) tablet Take 1 Tab by mouth every evening.  Aspirin, Buffered 81 mg tab Take 81 mg by mouth daily. Plan:  Obtain authorization for testing from insurance company. Report to follow once testing, scoring, and interpretation completed. ? Organic based neurocognitive issues versus mood disorder or combination of same. ? Problems organic, functional, or both? This note will not be viewable in [address] Patient here for third neuropsych. First exam showed mild dementia. Second exam was much improved.  Now here for third with cognitive and emotional issues as noted aove. Atypical case. Will eval and compare/contrast from previous. Neuropsychological Test Results  Patient Testing 6/7/18 Report Completed 6/8/18  A Psychometrist Assisted w/ portions of this evaluation while under my direct  supervision    The following evaluation procedures/tests were administered:      Neuropsychologist Administered/Interpreted:  Neuropsychological Mental Status Exam, Revised Memory & Behavior Checklist,  Mini Mental Status Exam, Clock Drawing Test, Test Of Premorbid Functioning, Merna-Melzack Pain Questionnaire, History Taking  & Clinical Interview With The Patient, Additional History Taking w/ The Patient's Spouse, Review Of Available Records. Psychometrist Administered under Neuropsychologist Supervision & Neuropsychologist Interpreted:  Verbal Fluency Tests, Alessandro & Alessandro - Revised, Trailmaking Test Parts A & B, Wechsler Adult Intelligence Scale - IV, Dory Continuous Performance Test - III, [employer] - 3, Grooved Pegboard, Delgado Depression Inventory - II, Delgado Anxiety Inventory, Personality Assessment Inventory. Test Findings:  Test Findings:  Note:  The patients raw data have been compared with currently available norms which include demographic corrections for age, gender, and/or education. Sometimes, the patients scores are compared to demographically similar individuals as close to the patients age, education level, etc., as possible. \"Average\" is viewed as being +/- 1 standard deviation (SD) from the stated mean for a particular test score. \"Low average\" is viewed as being between 1 and 2 SD below the mean, and above average is viewed as being 1 and 2 SD above the mean. Scores falling in the borderline range (between 1-1/2 and 2 SD below the mean) are viewed with particular attention as to whether they are normal or abnormal neurocognitive test scores.   Other methods of inference in analyzing the test data are also utilized, including the pattern and range of scores in the profile, bilateral motor functions, and the presence, if any, of pathognomonic signs. Behaviorally, the patient was friendly and cooperative and appeared motivated to perform well during this examination. Within this context, the results of this evaluation are viewed as a valid reflection of the patients actual neurocognitive and emotional status. His MMSE score of 29/30 correct was normal.  In this regard, recall for three words after a brief delay was 2/3 correct. Clock drawing was normal.        His structured word list fluency, as assessed by the FAS Test, was within the mildly impaired range with a T score of 35. Category fluency was within the mildly to moderately impaired range with a T score of 31. Confrontation naming ability, as assessed by the [facility] - Revised, was within the average range at 57/60 correct (T = 49). This pattern of performance is indicative of a patient who is at increased risk for day-to-day problems with verbal fluency and confrontation naming was normal.       The patient was administered the Dory Continuous Performance Test - III,a computer administered test of sustained attention, and review of the subscales within this instrument revealed mild concerns for inattentiveness without impulsivity. This pattern of performance is indicative of a patient who is at increased risk for day-to-day problems with sustained visual attention/concentration. The patient is not showing problems with working memory capacity (37th  %ile) or processing speed (34th %ile) on the WAIS-IV. His Verbal Comprehension Index score of 120 was within the superior range. His Perceptual Reasoning Index score of 113 was high average . These scores do not reflect a decline in functioning based on an assessment of premorbid functioning.         The patient was administered the [place] Verbal Learning Test  - 3 (alternate version)  and generated a normal to above normal range learning curve over five repeated auditory word list learning trials. An interference trial was within the normal range. Free and cued, short and long delayed recall were all within the normal range. Recognition recall was normal.  Forced choice recall was normal.    This pattern of performance is not indicative of a patient who is at increased risk for day-to-day problems with auditory learning and memory. Simple timed visual motor sequencing (Trailmaking Test Part A) was within the average range with a T score of 50. His performance on a similar, but more complex task of timed visual motor sequencing (Trailmaking Test Part B) was within the average range with a T score of 35. He made zero sequencing errors on this latter completed test.  Taken together, this pattern of performance is not indicative of a patient who is at increased risk for day-to-day problems with executive functioning. Fine motor dexterity was within the below average range for his dominant hand and mildly impaired for his nondominant hand. The scores are close together and neurologic correlation is indicated. The patient rated his current level of pain as \"2/5- Discomforting\" on the Merna-Melzack Pain Questionnaire. He reported pain in his face (right side)     His Delgado Depression Inventory- II score of 12 was within the normal range. His Delgado Anxiety Inventory score of 3 reflected minimal anxiety. The patient was administered the Personality Assessment Inventory and generated a valid profile for interpretation. Within this context, mild depression is present. He can be klein and overly sensitive  Self-concept is harsh and negative. He is inwardly more troubled by self-doubt and misgivings about his adequacy than readily apparent to others. He is uncomfortable and rather passive in social situations.   He reports suicidal thoughts on this measure and denied active plan or intent with me. His level of treatment motivation is somewhat low. Impressions & Recommendations: This is the patient's third Neuropsychological Evaluation. On the first exam, the results showed significant memory problems as well as problems with verbal fluency and attention. On the second exam, the patient again showed problems with verbal fluency, attention, and memory scores improved but were within the mildly impaired range. On the current examination, the patient continues to show mild problems with verbal fluency and attention but his memory scores are fully normal. IQ remains superior and his performance across all other neurocognitive domains assessed remain within the normal range. From an emotional standpoint, he reports currently mild depression with passive suicidal thinking (intent and/or plan were denied on interview). He continues to be rather socially introverted. He has a history of bipolar disorder not seen on any of the three evaluations here. He continues to smoke marijuana. He uses coconut oil, and other alternative physical and herbal therapies. This profile is again inconsistent with dementia. He is showing mild/minimal cognitive impairment again involving speech and attention. He is reporting mild depression. Antidepressant recently switched. I suggest mental health counseling as well as consideration for an appropriate medication for attention if not medically contraindicated. He is competent and I am not concerned about driving or day-to-day supervision. He no longer needs yearly appointments here unless there is any decline in functioning in the future. Stay mentally, physically, and socially active. Exam trend is quite reassuring. Clinical correlation is, of course, indicated. I will discuss these findings with the patient and family when they follow up with me in the near future.   A follow up Neuropsychological Evaluation is indicated on a prn basis. DIAGNOSES: Minimal Cognitive Impairment    Depression - Mild    History of Depression     The above information is based upon information currently available to me. If there is any additional information of which I am currently unaware, I would be more than happy to review it upon having it made available to me. Thank you for the opportunity to see this interesting individual.     Sincerely,       Yousuf Lyman. Anaid Avalos, PsyD, EdS      Attachments:  IQ Test Results (In Media Section Of This EMR)    Cc: Donna Mcwilliams MD    2 units -54939- 1.75 hours Record review. Review of history provided by patient. Review of collaborative information. Testing by Clinician. Review of raw data. Scoring. Report writing of individual tests administered by Clinician. Integration of individual tests administered by psychometrist (that were previously reported and billed under psychometry code below) with testing by clinician and review of records/history/collaborative information. Case Conceptualization, Report writing. Coordination Of Care. 4 units  -43992 - 3.75 hours Psychometrist test prep, administration, and scoring under clinician's direct supervision. Clinical interpretation of individual tests administered by psychometrist .  Clinician report of individual tests administered by psychometrist.    \"Unit\" is defined by CPT/National Guidelines (31 - 60 minutes). Integral services including scoring of raw data, data interpretation, case conceptualization, report writing etcetera were initiated after the patient finished testing/raw data collected and was completed on the date the report was signed.

## 2018-06-10 RX ORDER — PRIMIDONE 50 MG/1
TABLET ORAL
Qty: 60 TAB | Refills: 5 | Status: SHIPPED | OUTPATIENT
Start: 2018-06-10 | End: 2022-03-12 | Stop reason: ALTCHOICE

## 2018-06-22 ENCOUNTER — OFFICE VISIT (OUTPATIENT)
Dept: NEUROLOGY | Age: 73
End: 2018-06-22

## 2018-06-22 DIAGNOSIS — F33.0 DEPRESSION, MAJOR, RECURRENT, MILD (HCC): ICD-10-CM

## 2018-06-22 DIAGNOSIS — Z86.59 HISTORY OF BIPOLAR DISORDER: ICD-10-CM

## 2018-06-22 DIAGNOSIS — F12.90 USES MARIJUANA: ICD-10-CM

## 2018-06-22 DIAGNOSIS — G31.84 MINIMAL COGNITIVE IMPAIRMENT: Primary | ICD-10-CM

## 2018-06-22 NOTE — PROGRESS NOTES
Office feedback session with the patient and spouse today. I reviewed the results of the recent Neuropsychological Evaluation, including discussing individual tests as well as patient's areas of neurocognitive strength versus weakness. Education was provided regarding my diagnostic impressions, and we discussed treatment plan/options. I also answered numerous questions related to the clinical findings, including discussing various methods to improve cognition and mood. Counseling provided regarding mood and cognition. We processed all three of his neuropsych evaluations which are all inconsistent with dementia but instead struggles with attention and mood problems. Exam reassuring and lengthy discussion about his OTC supplements and such and I have no medical advise regarding medications here, but stay active and no need to pursue further testing on a regular basis unless some declines are noted over time. Exam reassuring. CBT and supportive psychotherapy techniques were utilized. The patient will follow up with the referring provider, and reported being very pleased with the services provided. [facility] 20 minutes with patient and spouse, record review, coordination of care. Records provided. We discussed: This is the patient's third Neuropsychological Evaluation. On the first exam, the results showed significant memory problems as well as problems with verbal fluency and attention. On the second exam, the patient again showed problems with verbal fluency, attention, and memory scores improved but were within the mildly impaired range. On the current examination, the patient continues to show mild problems with verbal fluency and attention but his memory scores are fully normal. IQ remains superior and his performance across all other neurocognitive domains assessed remain within the normal range.   From an emotional standpoint, he reports currently mild depression with passive suicidal thinking (intent and/or plan were denied on interview). He continues to be rather socially introverted. He has a history of bipolar disorder not seen on any of the three evaluations here. He continues to smoke marijuana. He uses coconut oil, and other alternative physical and herbal therapies.                           This profile is again inconsistent with dementia. He is showing mild/minimal cognitive impairment again involving speech and attention. He is reporting mild depression. Antidepressant recently switched. I suggest mental health counseling as well as consideration for an appropriate medication for attention if not medically contraindicated. He is competent and I am not concerned about driving or day-to-day supervision. He no longer needs yearly appointments here unless there is any decline in functioning in the future. Stay mentally, physically, and socially active. Exam trend is quite reassuring. Clinical correlation is, of course, indicated.                                I will discuss these findings with the patient and family when they follow up with me in the near future.   A follow up Neuropsychological Evaluation is indicated on a prn basis.       DIAGNOSES:           Minimal Cognitive Impairment                                              Depression - Mild                                              History of Depression

## 2018-08-03 ENCOUNTER — TELEPHONE (OUTPATIENT)
Dept: FAMILY MEDICINE CLINIC | Age: 73
End: 2018-08-03

## 2018-08-03 NOTE — TELEPHONE ENCOUNTER
Patient has an appointment scheduled with Dr. Alcira Bae for 9/26/18 for his medicare wellness. He is requested to get his lab work done prior to the appointment. Can you put in lab orders and let me know when they are in so that I can contact the patient?

## 2018-08-06 DIAGNOSIS — K21.9 GASTROESOPHAGEAL REFLUX DISEASE WITHOUT ESOPHAGITIS: ICD-10-CM

## 2018-08-06 DIAGNOSIS — G25.0 ESSENTIAL TREMOR: ICD-10-CM

## 2018-08-06 DIAGNOSIS — R35.1 NOCTURIA: ICD-10-CM

## 2018-08-06 DIAGNOSIS — E78.9 LIPID DISORDER: ICD-10-CM

## 2018-08-06 DIAGNOSIS — R73.09 ELEVATED GLUCOSE: ICD-10-CM

## 2018-08-06 DIAGNOSIS — D50.9 IRON DEFICIENCY ANEMIA, UNSPECIFIED IRON DEFICIENCY ANEMIA TYPE: ICD-10-CM

## 2018-08-06 DIAGNOSIS — C61 PROSTATE CANCER (HCC): Primary | ICD-10-CM

## 2018-08-06 DIAGNOSIS — E04.1 THYROID NODULE: ICD-10-CM

## 2018-08-06 DIAGNOSIS — F31.31 BIPOLAR AFFECTIVE DISORDER, CURRENTLY DEPRESSED, MILD (HCC): ICD-10-CM

## 2018-08-06 NOTE — TELEPHONE ENCOUNTER
Patient has an appointment scheduled with Dr. WHITLOCK [facility] for 9/26/18 for his medicare wellness. He is requested to get his lab work done prior to the appointment. Can you put in lab orders and let me know when they are in so that I can contact the patient?          Fasting labs ordered

## 2018-08-07 ENCOUNTER — LAB ONLY (OUTPATIENT)
Dept: FAMILY MEDICINE CLINIC | Age: 73
End: 2018-08-07

## 2018-08-07 ENCOUNTER — HOSPITAL ENCOUNTER (OUTPATIENT)
Dept: LAB | Age: 73
Discharge: HOME | End: 2018-08-07
Payer: MEDICARE

## 2018-08-07 PROCEDURE — 85018 HEMOGLOBIN: CPT

## 2018-08-07 PROCEDURE — 84153 ASSAY OF PSA TOTAL: CPT

## 2018-08-07 PROCEDURE — 80053 COMPREHEN METABOLIC PANEL: CPT

## 2018-08-07 PROCEDURE — 87086 URINE CULTURE/COLONY COUNT: CPT

## 2018-08-07 PROCEDURE — 80061 LIPID PANEL: CPT

## 2018-08-07 PROCEDURE — 36415 COLL VENOUS BLD VENIPUNCTURE: CPT

## 2018-08-07 PROCEDURE — 80178 ASSAY OF LITHIUM: CPT

## 2018-08-07 PROCEDURE — 84443 ASSAY THYROID STIM HORMONE: CPT

## 2018-08-07 PROCEDURE — 83036 HEMOGLOBIN GLYCOSYLATED A1C: CPT

## 2018-08-08 DIAGNOSIS — N39.0 URINARY TRACT INFECTION WITHOUT HEMATURIA, SITE UNSPECIFIED: Primary | ICD-10-CM

## 2018-08-08 LAB
ALBUMIN SERPL-MCNC: 4.3 G/DL (ref 3.5–4.8)
ALBUMIN/GLOB SERPL: 1.9 {RATIO} (ref 1.2–2.2)
ALP SERPL-CCNC: 64 IU/L (ref 39–117)
ALT SERPL-CCNC: 14 IU/L (ref 0–44)
AST SERPL-CCNC: 15 IU/L (ref 0–40)
BACTERIA UR CULT: NORMAL
BILIRUB SERPL-MCNC: 0.6 MG/DL (ref 0–1.2)
BUN SERPL-MCNC: 18 MG/DL (ref 8–27)
BUN/CREAT SERPL: 15 (ref 10–24)
CALCIUM SERPL-MCNC: 10.1 MG/DL (ref 8.6–10.2)
CHLORIDE SERPL-SCNC: 108 MMOL/L (ref 96–106)
CHOLEST SERPL-MCNC: 176 MG/DL (ref 100–199)
CO2 SERPL-SCNC: 18 MMOL/L (ref 20–29)
CREAT SERPL-MCNC: 1.19 MG/DL (ref 0.76–1.27)
EST. AVERAGE GLUCOSE BLD GHB EST-MCNC: 117 MG/DL
GLOBULIN SER CALC-MCNC: 2.3 G/DL (ref 1.5–4.5)
GLUCOSE SERPL-MCNC: 101 MG/DL (ref 65–99)
HBA1C MFR BLD: 5.7 % (ref 4.8–5.6)
HCT VFR BLD AUTO: 38.2 % (ref 37.5–51)
HDLC SERPL-MCNC: 54 MG/DL
HGB BLD-MCNC: 12.6 G/DL (ref 13–17.7)
INTERPRETATION, 910389: NORMAL
LDLC SERPL CALC-MCNC: 96 MG/DL (ref 0–99)
LITHIUM SERPL-SCNC: 1.6 MMOL/L (ref 0.6–1.2)
POTASSIUM SERPL-SCNC: 4.8 MMOL/L (ref 3.5–5.2)
PROT SERPL-MCNC: 6.6 G/DL (ref 6–8.5)
PSA SERPL-MCNC: <0.1 NG/ML (ref 0–4)
SODIUM SERPL-SCNC: 140 MMOL/L (ref 134–144)
TRIGL SERPL-MCNC: 131 MG/DL (ref 0–149)
TSH SERPL DL<=0.005 MIU/L-ACNC: 3.21 UIU/ML (ref 0.45–4.5)
VLDLC SERPL CALC-MCNC: 26 MG/DL (ref 5–40)

## 2018-08-08 RX ORDER — DOXYCYCLINE 100 MG/1
100 TABLET ORAL DAILY
Qty: 14 TAB | Refills: 0 | Status: SHIPPED | OUTPATIENT
Start: 2018-08-08 | End: 2018-08-22

## 2018-08-08 NOTE — PROGRESS NOTES
Your lithium level is high. Please follow up soon to discuss. Your PSA is zero. Your sugars are stable. Your borderline anemia persists but is stable over the last year.

## 2018-09-26 ENCOUNTER — OFFICE VISIT (OUTPATIENT)
Dept: FAMILY MEDICINE CLINIC | Age: 73
End: 2018-09-26

## 2018-09-26 ENCOUNTER — HOSPITAL ENCOUNTER (OUTPATIENT)
Dept: LAB | Age: 73
Discharge: HOME | End: 2018-09-26
Payer: MEDICARE

## 2018-09-26 VITALS
RESPIRATION RATE: 18 BRPM | DIASTOLIC BLOOD PRESSURE: 58 MMHG | WEIGHT: 162 LBS | HEIGHT: 67 IN | HEART RATE: 69 BPM | BODY MASS INDEX: 25.43 KG/M2 | TEMPERATURE: 98.4 F | SYSTOLIC BLOOD PRESSURE: 119 MMHG | OXYGEN SATURATION: 95 %

## 2018-09-26 DIAGNOSIS — Z23 ENCOUNTER FOR IMMUNIZATION: Primary | ICD-10-CM

## 2018-09-26 DIAGNOSIS — Z00.00 HEALTHCARE MAINTENANCE: ICD-10-CM

## 2018-09-26 DIAGNOSIS — Z87.891 HISTORY OF SMOKING 30 OR MORE PACK YEARS: ICD-10-CM

## 2018-09-26 DIAGNOSIS — F31.31 BIPOLAR AFFECTIVE DISORDER, CURRENTLY DEPRESSED, MILD (HCC): ICD-10-CM

## 2018-09-26 DIAGNOSIS — T56.891D LITHIUM TOXICITY, ACCIDENTAL OR UNINTENTIONAL, SUBSEQUENT ENCOUNTER: ICD-10-CM

## 2018-09-26 DIAGNOSIS — R82.71 BACTERIURIA: ICD-10-CM

## 2018-09-26 DIAGNOSIS — Z13.5 GLAUCOMA SCREENING: ICD-10-CM

## 2018-09-26 PROCEDURE — 80178 ASSAY OF LITHIUM: CPT

## 2018-09-26 PROCEDURE — 87086 URINE CULTURE/COLONY COUNT: CPT

## 2018-09-26 PROCEDURE — 36415 COLL VENOUS BLD VENIPUNCTURE: CPT

## 2018-09-26 RX ORDER — SERTRALINE HYDROCHLORIDE 50 MG/1
50 TABLET, FILM COATED ORAL DAILY
Qty: 90 TAB | Refills: 3
Start: 2018-09-26

## 2018-09-26 RX ORDER — LITHIUM CARBONATE 300 MG
300 TABLET ORAL DAILY
Qty: 90 TAB | Refills: 3
Start: 2018-09-26

## 2018-09-26 NOTE — PATIENT INSTRUCTIONS
Labs today Stay active 
 
obtain chest CT. Call #[phone] to schedule Follow up with your specialists

## 2018-09-26 NOTE — MR AVS SNAPSHOT
[address] 
[phone] Patient: Cindy Oro MRN: [id number] :1945 Visit Information Date & Time Provider Department Dept. Phone Encounter #  
 2018  1:00 PM Tanna Rose MD [address] [place] [address] [phone] [id number] Your Appointments 10/5/2018 11:40 AM  
Follow Up with Ashley Abad MD  
[facility] at [facility]) Appt Note: Follow up $0CP tdb 10/5/17  
 [address] [place] [address], 
[address], [address] [facility] [address]  
[address] [facility], [address] [facility], [address] [facility] [address] Upcoming Health Maintenance Date Due Shingrix Vaccine Age 50> (1 of 2) 1996 Influenza Age 5 to Adult 2018 GLAUCOMA SCREENING Q2Y 2018 MEDICARE YEARLY EXAM 2018 FOBT Q 1 YEAR AGE 50-75 10/2/2018 DTaP/Tdap/Td series (2 - Td) 2024 Allergies as of 2018  Review Complete On: 2018 By: Laina Lund LPN Severity Noted Reaction Type Reactions Sulfa (Sulfonamide Antibiotics)  2014    Nausea and Vomiting Oral only Current Immunizations  Reviewed on 2018 Name Date Influenza High Dose Vaccine PF 2017, 2016 Influenza Vaccine (Tri) Adjuvanted 2018 Pneumococcal Conjugate (PCV-13) 2015 Pneumococcal Polysaccharide (PPSV-23) 2014 Tdap 2014 Varicella Virus Vaccine 2013 Reviewed by Laina Lund LPN on  at  1:08 PM  
You Were Diagnosed With   
  
 Codes Comments Encounter for immunization    -  Primary ICD-10-CM: U69 ICD-9-CM: V03.89 Healthcare maintenance     ICD-10-CM: Z00.00 ICD-9-CM: V70.0 Lithium toxicity, accidental or unintentional, subsequent encounter     ICD-10-CM: T56.891D ICD-9-CM: V58.89, 985.8 he went down from 600 to 300 mg now  Bacteriuria     ICD-10-CM: R82.71 
 ICD-9-CM: 791.9 History of smoking 30 or more pack years     ICD-10-CM: Z87.891 ICD-9-CM: V15.82 Glaucoma screening     ICD-10-CM: Z13.5 ICD-9-CM: V80.1 Bipolar affective disorder, currently depressed, mild (HCC)     ICD-10-CM: F31.31 
ICD-9-CM: 296.51 Vitals BP Pulse Temp Resp Height(growth percentile) Weight(growth percentile) 119/58 (BP 1 Location: Right arm, BP Patient Position: Sitting) 69 98.4 °F (36.9 °C) (Oral) 18 5' 7\" (1.702 m) 162 lb (73.5 kg) SpO2 BMI Smoking Status 95% 25.37 kg/m2 Former Smoker BMI and BSA Data Body Mass Index Body Surface Area  
 25.37 kg/m 2 1.86 m 2 Preferred Pharmacy Pharmacy Name Phone [id number] - [place] [address] [phone] Your Updated Medication List  
  
   
This list is accurate as of 9/26/18  2:07 PM.  Always use your most recent med list.  
  
  
  
  
 aspirin, buffered 81 mg Tab Take 81 mg by mouth daily. clotrimazole-betamethasone topical cream  
Commonly known as:  [place] Apply  to affected area two (2) times a day. COCONUT OIL PO Take  by mouth. 7.5 mL by mouth each morning, 7.5 mL at midday, 5 mL at dinner and 5 mL at bedtime  
  
 lithium carbonate 300 mg tablet Take 1 Tab by mouth daily. multivitamin tablet Commonly known as:  ONE A DAY Take 1 Tab by mouth every evening. omeprazole 40 mg capsule Commonly known as:  PRILOSEC Take 40 mg by mouth Daily (before breakfast). primidone 50 mg tablet Commonly known as: MYSOLINE  
TAKE 1 TAB BY MOUTH DAILY(WITH DINNER). sertraline 50 mg tablet Commonly known as:  ZOLOFT Take 1 Tab by mouth daily. We Performed the Following ADMIN INFLUENZA VIRUS VAC [ [facility]] CULTURE, URINE [id number] CPT(R)] HM GLAUCOMA SCREENING [SN43 Custom] INFLUENZA VACCINE INACTIVATED (IIV), SUBUNIT, ADJUVANTED, IM [id number] CPT(R)] LITHIUM [id number] CPT(R)] REFERRAL TO OPHTHALMOLOGY [REF57 Custom] Comments:  
 See Dr. Lucien Martinez for vision #[phone] To-Do List   
 09/26/2018 Imaging:  CT LOW DOSE LUNG CANCER SCREENING Referral Information Referral ID Referred By Referred To  
  
 [id number] Shannan Brar [facility] [address] [place], [address] Visits Status Start Date End Date 1 New Request 9/26/18 9/26/19 If your referral has a status of pending review or denied, additional information will be sent to support the outcome of this decision. Patient Instructions Labs today Stay active 
 
obtain chest CT. Call #[phone] to schedule Follow up with your specialists Introducing \[facility]\"" & HEALTH SERVICES! Dear Catherine Medel: Thank you for requesting a [facility] account. Our records indicate that you already have an active [employer] account. You can access your account anytime at [email or web address] Did you know that you can access your hospital and ER discharge instructions at any time in [facility]? You can also review all of your test results from your hospital stay or ER visit. Additional Information If you have questions, please visit the Frequently Asked Questions section of the [employer] website at [email or web address] Remember, [facility] is NOT to be used for urgent needs. For medical emergencies, dial 911. Now available from your iPhone and Android! Please provide this summary of care documentation to your next provider. Your primary care clinician is listed as Alvin Lynn. If you have any questions after today's visit, please call [phone].

## 2018-09-26 NOTE — PROGRESS NOTES
Annelise Davis is a 68 y.o. male Issues discussed today include: WELLNESS  
 
PSA:  Zero 2018 AAA screen:   Negative 2016 Screening chest CT scan:   2017 Hearing screen:   done Vision screening:   done Depression screening:   Done. PHQ9 = 3 Fall assessment:   done We discussed health maintenance BMI = Body mass index is 25.37 kg/(m^2). We discussed diet/exercise/healthy weight We discussed avoiding tobacco products We reviewed and updated pertinent past medical history in the problem list 
 
 
Colonoscopy:  2016 TDAP:  2014 Pneumovax:  2014 PCV-13:  2015 Flu shot:  2018 Zostavax:  2013      Shingrix:  declined Eye exam:  Needs 2018 EKG: On file FTF for DME:  done:  none Advanced directive:  Full code, wife would be decision-maker if needed Specialists:  Evette De Santiago Nutrition Fred Wade Memory Tony Holder We reivewed labs form 8/8/2018 Data reviewed or ordered today:  labs Other problems include: 
Patient Active Problem List  
Diagnosis Code  Bipolar affective ([address] F31.9  
 Essential tremor G25.0  Nonspecific abnormal electrocardiogram (ECG) (EKG) R94.31  
 Pulmonary nodule R91.1  COPD (chronic obstructive pulmonary disease) (HCC) J44.9  Colon polyps K63.5  Former smoker Z87.891  
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  
 Insomnia G47.00  Restless leg syndrome G25.81  
 GERD (gastroesophageal reflux disease) K21.9  Thyroid nodule E04.1 [address] maintenance Z00.00  Elevated glucose R73.09  
 Lipid disorder E78.9  
 Nuclear cataract [id number] Medications: 
Current Outpatient Prescriptions Medication Sig Dispense Refill  sertraline (ZOLOFT) 50 mg tablet Take 1 Tab by mouth daily.  90 Tab 3  
  lithium carbonate 300 mg tablet Take 1 Tab by mouth daily. 90 Tab 3  primidone (MYSOLINE) 50 mg tablet TAKE 1 TAB BY MOUTH DAILY(WITH DINNER). 60 Tab 5  clotrimazole-betamethasone (LOTRISONE) topical cream Apply  to affected area two (2) times a day.  omeprazole (PRILOSEC) 40 mg capsule Take 40 mg by mouth Daily (before breakfast).  COCONUT OIL PO Take  by mouth. 7.5 mL by mouth each morning, 7.5 mL at midday, 5 mL at dinner and 5 mL at bedtime  multivitamin (ONE A DAY) tablet Take 1 Tab by mouth every evening.  Aspirin, Buffered 81 mg tab Take 81 mg by mouth daily. Allergies: Allergies Allergen Reactions  Sulfa (Sulfonamide Antibiotics) Nausea and Vomiting Oral only LMP:  No LMP for male patient. Social History Social History  Marital status:  Spouse name: N/A  
 Number of children: N/A  
 Years of education: N/A Occupational History  Not on file. Social History Main Topics  Smoking status: Former Smoker Packs/day: 1.00 Years: 50.00 Quit date: 9/18/2014  Smokeless tobacco: Never Used  Alcohol use No  
 Drug use: Yes Special: Marijuana Comment: 1 every evening  Sexual activity: Not Currently Other Topics Concern  Not on file Social History Narrative Family History Problem Relation Age of Onset  Diabetes Father  Cancer Maternal Grandmother   
  stomach cancer  Heart Disease Maternal Grandmother  Stroke Maternal Grandmother  Cancer Other  Heart Disease Other   
 
eaningful use:  done ROS: 
Headaches:  no 
Chest Pain:  no 
SOB:  no 
Fevers:  no Falls:  no 
anxiety/depression/losing interest in doing things that were previously enjoyed:  no. PHQ2 = 2, PHQ9 = 3 Other significant ROS:  Loose bowels, blurred vision Patient denied problems with:   
Hearing, speaking/swallowing, Reflux/indigestion, Cough,Diarrhea/constipation,Problems passing or controlling urine,  Mood (anxiety/depression/losing interest in doing things that were previously enjoyed), Snoring/sleep apnea,Fatigue, Weight change, memory Any other Positive ROS include: he feels well Falls in the past 12 months:  no        
 
Over the last year (or since your last visit):  Have you been diagnosed with heart attack, stroke, broken bones, or skin cancer = no Exercise:  Needs more Smoking history: Former, 50 pack year history No LMP for male patient. Physical Exam 
Visit Vitals  /58 (BP 1 Location: Right arm, BP Patient Position: Sitting)  Pulse 69  Temp 98.4 °F (36.9 °C) (Oral)  Resp 18  Ht 5' 7\" (1.702 m)  Wt 162 lb (73.5 kg)  SpO2 95%  BMI 25.37 kg/m2 BP Readings from Last 3 Encounters:  
09/26/18 119/58  
10/05/17 90/52  
10/05/17 132/78 Constitutional:  Appears well,  No Acute Distress, Vitals noted Psychiatric:   Affect normal, Alert and cooperative, Oriented to person/place/time Eyes:   Pupils equally round and reactive, EOMI, conjunctiva clear, eyelids normal 
ENT:   External ears and nose normal/lips, teeth=OK/gums normal, TMs and Orophyarynx normal 
Neck:   general inspection and Thyroid normal.  No abnormal cervical or supraclavicular nodes Lungs:   clear to auscultation, good respiratory effort Heart: Ausculation normal.  Regular rhythm. No cardiac murmurs. No carotid bruits or palpable thrills Chest wall normal 
Abdominal exam:   normal.  Liver and spleen normal.  No bruits/masses/tenderness Extremities:   without edema, good peripheral pulses Skin:   Warm to palpation, without rashes, bruising, or suspicious lesions Neuro:  Resting tremor right hand Assessment:   
Patient Active Problem List  
Diagnosis Code  Bipolar affective ([facility] F31.9  Essential tremor G25.0  Nonspecific abnormal electrocardiogram (ECG) (EKG) R94.31  
 Pulmonary nodule R91.1  COPD (chronic obstructive pulmonary disease) (HCC) J44.9  Colon polyps K63.5  Former smoker Z87.891  
 Urinary incontinence, stress, male N39.3  Prostate cancer (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  
 Insomnia G47.00  Restless leg syndrome G25.81  
 GERD (gastroesophageal reflux disease) K21.9  Thyroid nodule E04.1 [address] maintenance Z00.00  Elevated glucose R73.09  
 Lipid disorder E78.9  
 Nuclear cataract [id number] Today's diagnoses are: ICD-10-CM ICD-9-CM 1. Encounter for immunization Z23 V03.89 INFLUENZA VACCINE INACTIVATED (IIV), SUBUNIT, ADJUVANTED, IM  
   ADMIN INFLUENZA VIRUS VAC 2. Healthcare maintenance Z00.00 V70.0 3. Lithium toxicity, accidental or unintentional, subsequent encounter T56.891D V58.89 LITHIUM  
  985.8   
 he went down from 600 to 300 mg now 4. Bacteriuria R82.71 791.9 CULTURE, URINE 5. History of smoking 30 or more pack years Z87.891 V15.82 CT LOW DOSE LUNG CANCER SCREENING 6. Glaucoma screening Z13.5 V80.1 REFERRAL TO OPHTHALMOLOGY  
    GLAUCOMA SCREENING 7. Bipolar affective disorder, currently depressed, mild ([facility]) F31.31 296.51 sertraline (ZOLOFT) 50 mg tablet  
   lithium carbonate 300 mg tablet Plan: 
Orders Placed This Encounter  CULTURE, URINE  CT LOW DOSE LUNG CANCER SCREENING Standing Status:   Future Standing Expiration Date:   9/27/2019 Order Specific Question:   Is this a low dose CT or a routine CT? Answer:   Low Dose CT [1] Order Specific Question:   Reason for exam  
  Answer:   Lung Cancer Screening Order Specific Question:   Smoking Status Answer: Former Smoker [4] Order Specific Question:   Number of years since quit? Answer:   4 Order Specific Question:   Smoking history; number of pack-years Answer:   48 Order Specific Question:   The patient age is 50-69 years. **NOTE: If \"NO\" this study may not be covered by insurance. Answer:   Yes Order Specific Question:   Does the patient show any signs or symptoms of lung cancer? Answer:   No  
  Order Specific Question:   Is this the first (baseline) CT or an annual exam? Answer:   Baseline [1] Order Specific Question:   Is there documentation of shared decision making? Answer:   Yes Order Specific Question:   The patient was informed of the importance of adherence to annual screening, impact of comorbidities and ability/willingness to undergo diagnosis and treatment Answer:   Yes Order Specific Question:   The patient was informed of the importance of smoking cessation and/or maintaining smoking abstinence, including the offer of Medicare-covered tobacco cessation counseling services, if applicable Answer: Yes  Influenza Vaccine Inactivated (IIV)(FLUAD), Subunit, Adjuvanted, IM, (73762)  LITHIUM  
 REFERRAL TO OPHTHALMOLOGY Referral Priority:   Routine Referral Type:   Consultation Referral Reason:   Specialty Services Required Referral Location:   [facility] Referred to Provider:   Alma Cox MD  
  Requested Specialty:   Ophthalmology  HM GLAUCOMA SCREENING  
 ADMIN INFLUENZA VIRUS VAC  sertraline (ZOLOFT) 50 mg tablet Sig: Take 1 Tab by mouth daily. Dispense:  90 Tab Refill:  3  
 lithium carbonate 300 mg tablet Sig: Take 1 Tab by mouth daily. Dispense:  90 Tab Refill:  3 See patient instructions There are no Patient Instructions on file for this visit. refresh note:  done AVS Printed:  done Diagnoses and all orders for this visit: 1. Encounter for immunization -     Influenza Vaccine Inactivated (IIV)(FLUAD), Subunit, Adjuvanted, IM, (27983) -     ADMIN INFLUENZA VIRUS VAC 2. Healthcare maintenance 3. Lithium toxicity, accidental or unintentional, subsequent encounter Comments: 
he went down from 600 to 300 mg now Orders: 
-     LITHIUM 4. Bacteriuria 
-     CULTURE, URINE 5. History of smoking 30 or more pack years -     CT LOW DOSE LUNG CANCER SCREENING; Future 6. Glaucoma screening 
-     REFERRAL TO OPHTHALMOLOGY 
-      GLAUCOMA SCREENING 7. Bipolar affective disorder, currently depressed, mild (HCC) 
-     sertraline (ZOLOFT) 50 mg tablet; Take 1 Tab by mouth daily. -     lithium carbonate 300 mg tablet; Take 1 Tab by mouth daily.

## 2018-09-26 NOTE — PROGRESS NOTES
1. Have you been to the ER, urgent care clinic since your last visit? Hospitalized since your last visit? No 
 
2. Have you seen or consulted any other health care providers outside of the [address] [place] [address] since your last visit? Include any pap smears or colon screening. No 
 
Chief Complaint Patient presents with [address] [place] [address] Annual Wellness Visit Blood pressure 119/58, pulse 69, temperature 98.4 °F (36.9 °C), temperature source Oral, resp. rate 18, height 5' 7\" (1.702 m), weight 162 lb (73.5 kg), SpO2 95 %.

## 2018-09-27 LAB — BACTERIA UR CULT: NO GROWTH

## 2018-09-28 LAB — LITHIUM SERPL-SCNC: 0.6 MMOL/L (ref 0.6–1.2)

## 2018-09-29 NOTE — PROGRESS NOTES
Your urine culture is now negative. Nothing to do. Your lithium level is back down. Congratulations.

## 2018-10-05 ENCOUNTER — OFFICE VISIT (OUTPATIENT)
Dept: NEUROLOGY | Age: 73
End: 2018-10-05

## 2018-10-05 VITALS
OXYGEN SATURATION: 97 % | HEART RATE: 56 BPM | HEIGHT: 67 IN | DIASTOLIC BLOOD PRESSURE: 62 MMHG | SYSTOLIC BLOOD PRESSURE: 132 MMHG | WEIGHT: 158 LBS | BODY MASS INDEX: 24.8 KG/M2

## 2018-10-05 DIAGNOSIS — F31.9 BIPOLAR I DISORDER (HCC): ICD-10-CM

## 2018-10-05 DIAGNOSIS — G25.81 RESTLESS LEGS: ICD-10-CM

## 2018-10-05 DIAGNOSIS — G31.84 MINIMAL COGNITIVE IMPAIRMENT: ICD-10-CM

## 2018-10-05 DIAGNOSIS — G25.0 ESSENTIAL TREMOR: ICD-10-CM

## 2018-10-05 DIAGNOSIS — G20 PARKINSON'S DISEASE (TREMOR, STIFFNESS, SLOW MOTION, UNSTABLE POSTURE) (HCC): Primary | ICD-10-CM

## 2018-10-05 NOTE — PATIENT INSTRUCTIONS
Office Policies  · Phone calls/patient messages:  Please allow up to 24 hours for someone in the office to contact you about your call or message. Be mindful your provider may be out of the office or your message may require further review. We encourage you to use [employer] for your messages as this is a faster, more efficient way to communicate with our office  · Medication Refills:  Prescription medications require up to 48 business hours to process. We encourage you to use [employer] for your refills. For controlled medications: Please allow up to 72 business hours to process. Certain medications may require you to  a written prescription at our office. NO narcotic/controlled medications will be prescribed after 4pm Monday through Friday or on weekends  · Form/Paperwork Completion:  Please note there is a $25 fee for all paperwork completed by our providers. We ask that you allow 7-14 business days. Pre-payment is due prior to picking up/faxing the completed form. You may also download your forms to [employer] to have your doctor print off.

## 2018-10-05 NOTE — MR AVS SNAPSHOT
[address] [place] [address], 
[facility], [address] [facility] 
[phone] Patient: Simon Bacon MRN: [id number] :1945 Visit Information Date & Time Provider Department Dept. Phone Encounter #  
 10/5/2018 11:40 AM Tahir Ch MD Neurology [facility] at [facility] [phone] [id number] Follow-up Instructions Return in about 1 year (around 10/5/2019) for tremor. Upcoming Health Maintenance Date Due Shingrix Vaccine Age 50> (1 of 2) 2019* FOBT Q 1 YEAR AGE 50-75 2019* MEDICARE YEARLY EXAM 2019 GLAUCOMA SCREENING Q2Y 2020 DTaP/Tdap/Td series (2 - Td) 2024 *Topic was postponed. The date shown is not the original due date. Allergies as of 10/5/2018  Review Complete On: 10/5/2018 By: Tahir Ch MD  
  
 Severity Noted Reaction Type Reactions Sulfa (Sulfonamide Antibiotics)  2014    Nausea and Vomiting Oral only Current Immunizations  Reviewed on 2018 Name Date Influenza High Dose Vaccine PF 2017, 2016 Influenza Vaccine (Tri) Adjuvanted 2018 Pneumococcal Conjugate (PCV-13) 2015 Pneumococcal Polysaccharide (PPSV-23) 2014 Tdap 2014 Varicella Virus Vaccine 2013 Not reviewed this visit You Were Diagnosed With   
  
 Codes Comments Parkinson's disease (tremor, stiffness, slow motion, unstable posture) ([facility] [address]    -  Primary ICD-10-CM: G20 
ICD-9-CM: 332.0 Restless legs     ICD-10-CM: G25.81 ICD-9-CM: 333.94 Bipolar I disorder ([facility] [address]     ICD-10-CM: F31.9 ICD-9-CM: 296.7 Minimal cognitive impairment     ICD-10-CM: G31.84 ICD-9-CM: 331.83 Essential tremor     ICD-10-CM: G25.0 ICD-9-CM: 333.1 Vitals BP Pulse Height(growth percentile) Weight(growth percentile) SpO2 BMI  
 132/62 (!) 56 5' 7\" (1.702 m) 158 lb (71.7 kg) 97% 24.75 kg/m2 Smoking Status Former Smoker Vitals History BMI and BSA Data Body Mass Index Body Surface Area 24.75 kg/m 2 1.84 m 2 Preferred Pharmacy Pharmacy Name Phone [id number], [address] [place] [address] [phone] Your Updated Medication List  
  
   
This list is accurate as of 10/5/18 12:39 PM.  Always use your most recent med list.  
  
  
  
  
 aspirin, buffered 81 mg Tab Take 81 mg by mouth daily. clotrimazole-betamethasone topical cream  
Commonly known as:  Jerilyn Harrison Apply  to affected area two (2) times a day. COCONUT OIL PO Take  by mouth. 7.5 mL by mouth each morning, 7.5 mL at midday, 5 mL at dinner and 5 mL at bedtime  
  
 lithium carbonate 300 mg tablet Take 1 Tab by mouth daily. multivitamin tablet Commonly known as:  ONE A DAY Take 1 Tab by mouth every evening. omeprazole 40 mg capsule Commonly known as:  PRILOSEC Take 40 mg by mouth Daily (before breakfast). primidone 50 mg tablet Commonly known as: MYSOLINE  
TAKE 1 TAB BY MOUTH DAILY(WITH DINNER). sertraline 50 mg tablet Commonly known as:  ZOLOFT Take 1 Tab by mouth daily. Follow-up Instructions Return in about 1 year (around 10/5/2019) for tremor. To-Do List   
 10/24/2018 1:00 PM  
  Appointment with [facility] 2 at [facility] ([phone]) NON-CONTRAST STUDY: 1. [facility] films/images pertaining to the area of interest with you on the day of appointment. 2. Check in at registration at least 30 minutes before appt time unless you were instructed to do otherwise. 3. If you have to drink oral contrast please pick it up any weekday prior to your appointment, if you cannot please check in 2 hrs  before appt time. Patient Instructions Office Policies · Phone calls/patient messages: Please allow up to 24 hours for someone in the office to contact you about your call or message. Be mindful your provider may be out of the office or your message may require further review. We encourage you to use [employer] for your messages as this is a faster, more efficient way to communicate with our office · Medication Refills: 
Prescription medications require up to 48 business hours to process. We encourage you to use [employer] for your refills. For controlled medications: Please allow up to 72 business hours to process. Certain medications may require you to  a written prescription at our office. NO narcotic/controlled medications will be prescribed after 4pm Monday through Friday or on weekends · Form/Paperwork Completion: 
Please note there is a $25 fee for all paperwork completed by our providers. We ask that you allow 7-14 business days. Pre-payment is due prior to picking up/faxing the completed form. You may also download your forms to [employer] to have your doctor print off. Introducing [facility]! Dear Cleveland Myers: Thank you for requesting a [employer] account. Our records indicate that you already have an active [employer] account. You can access your account anytime at [email or web address] Did you know that you can access your hospital and ER discharge instructions at any time in [employer]? You can also review all of your test results from your hospital stay or ER visit. Additional Information If you have questions, please visit the Frequently Asked Questions section of the [employer] website at [email or web address] Remember, [employer] is NOT to be used for urgent needs. For medical emergencies, dial 911. Now available from your iPhone and Android! Please provide this summary of care documentation to your next provider. Your primary care clinician is listed as Gina Morgan. If you have any questions after today's visit, please call [phone].

## 2018-10-05 NOTE — PROGRESS NOTES
Neuro Progress Note    Name Milton Echevarria Age 68 y.o. MRN [id number]  1945         Date of Note:  2018    Milton Echevarria returns for a follow up visit. His tremor has improved significantly. His restless leg syndrome is no longer an issue. Has very infrequent bouts and changing his sleeping pattern to going to bed later. He has discontinued donepezil. Latest lithum level was decreased to 300mg. Allergies  Sulfa (sulfonamide antibiotics)    Current Outpatient Prescriptions   Medication Sig    primidone (MYSOLINE) 50 mg tablet Take 1 Tab by mouth daily (with dinner).  donepezil (ARICEPT) 5 mg tablet TAKE 1 TAB BY MOUTH NIGHTLY.  omeprazole (PRILOSEC) 40 mg capsule Take 40 mg by mouth Daily (before breakfast).  COCONUT OIL PO Take  by mouth. 7.5 mL by mouth each morning, 7.5 mL at midday, 5 mL at dinner and 5 mL at bedtime    buPROPion (WELLBUTRIN) 100 mg tablet Take 1 Tab by mouth daily.  lithium carbonate 300 mg tablet Take 1 Tab by mouth two (2) times a day.  multivitamin (ONE A DAY) tablet Take 1 Tab by mouth every evening.  Aspirin, Buffered 81 mg tab Take 81 mg by mouth daily. No current facility-administered medications for this visit. Past Medical History   Diagnosis Date    Alzheimer disease 2016    Hughes's esophagus with esophagitis     CAD (coronary artery disease)      minor calcification in heart    Chronic obstructive pulmonary disease (HCC)      mild    Diverticulitis     Essential tremor     Prostate cancer ([facility] [address] 2004     prostatectomy    Psychiatric disorder      bipolar    Psychotic disorder     Restless leg syndrome         Review of Systems   Constitutional: Negative for chills and fever. HENT: Negative for ear pain. Eyes: Negative for pain and discharge. Respiratory: Negative for cough and hemoptysis. Cardiovascular: Negative for chest pain and claudication. Gastrointestinal: Negative for constipation and diarrhea. Genitourinary: Negative for flank pain and hematuria. Musculoskeletal: Negative for back pain and myalgias. Skin: Negative for itching and rash. Neurological: Positive for tremors. Negative for headaches. Endo/Heme/Allergies: Negative for environmental allergies. Does not bruise/bleed easily. Psychiatric/Behavioral: Negative for depression and hallucinations. The patient is nervous/anxious. Exam:     Visit Vitals    /60    Pulse (!) 58    Wt 164 lb 3.2 oz (74.5 kg)    SpO2 98%    BMI 25.72 kg/m2        General: Well developed, well nourished. Patient in no apparent distress   Head: Normocephalic, atraumatic, anicteric sclera   Lungs:  Clear to auscultation bilaterally, No wheezes or rubs   Cardiac: Regular rate and rhythm with no murmurs. Abd: Bowel sounds were audible. No tenderness on palpation   Ext: No pedal edema   Skin: No overt signs of rash or insect bites     Neurological Exam:    Patient is awake and alert. Speech is fluent. Oriented to person place and time. Cranial nerves II-XII are intact with no perceived deficits. Muscle strength is symmetric with no lateralizing weakness. Tone has cog wheeling and increased and what appears to be a resting tremor. Reflexes are symmetrically 2+ over the major tendons. Sensory exam is symmetric and intact in all modalities. Gait is well balanced. Assessment & Plan     1. Essential tremor vs parkinson's tremor  Trial of sinemet    2. Minimal cognitive impairment  Continue aricept  Schedule for a neuropsyche examination     3. Restless Legs  resolved with accupunture    4.  BIpolar I  Continue lithum    Patient Active Problem List   Diagnosis Code    Bipolar affective ([facility] [address] F31.9    Essential tremor G25.0    Nonspecific abnormal electrocardiogram (ECG) (EKG) R94.31    Pulmonary nodule R91.1    COPD (chronic obstructive pulmonary disease) (HCC) J44.9    Colon polyps K63.5    Former smoker Z87.891    Urinary incontinence, stress, male N39.3    Prostate cancer ([facility] [address] C61    Anemia D64.9    Hughes esophagus K22.70    Memory difficulties R41.3    Diverticulitis K57.92    Insomnia G47.00    Restless leg syndrome G25.81    GERD (gastroesophageal reflux disease) K21.9    Thyroid nodule E04.1    Healthcare maintenance Z00.00    Elevated glucose R73.09    Lipid disorder E78.9

## 2018-10-24 ENCOUNTER — HOSPITAL ENCOUNTER (OUTPATIENT)
Dept: CT IMAGING | Age: 73
Discharge: HOME | End: 2018-10-24
Payer: MEDICARE

## 2018-10-24 DIAGNOSIS — Z87.891 HISTORY OF SMOKING 30 OR MORE PACK YEARS: ICD-10-CM

## 2018-10-24 PROCEDURE — G0297 LDCT FOR LUNG CA SCREEN: HCPCS

## 2019-10-07 ENCOUNTER — OFFICE VISIT (OUTPATIENT)
Dept: NEUROLOGY | Age: 74
End: 2019-10-07

## 2019-10-07 VITALS
WEIGHT: 158 LBS | BODY MASS INDEX: 24.8 KG/M2 | OXYGEN SATURATION: 98 % | HEART RATE: 70 BPM | HEIGHT: 67 IN | SYSTOLIC BLOOD PRESSURE: 132 MMHG | DIASTOLIC BLOOD PRESSURE: 60 MMHG | TEMPERATURE: 97.8 F | RESPIRATION RATE: 16 BRPM

## 2019-10-07 DIAGNOSIS — R20.2 PARESTHESIA OF BOTH FEET: ICD-10-CM

## 2019-10-07 DIAGNOSIS — Z13.1 ENCOUNTER FOR SCREENING FOR DIABETES MELLITUS: ICD-10-CM

## 2019-10-07 DIAGNOSIS — R20.2 PARESTHESIA OF BOTH FEET: Primary | ICD-10-CM

## 2019-10-07 DIAGNOSIS — G57.93 UNSPECIFIED MONONEUROPATHY OF BILATERAL LOWER LIMBS: ICD-10-CM

## 2019-10-07 NOTE — PROGRESS NOTES
Neurology Progress Note    Patient ID:  Mac Serenity  [id number]  74 y.o.  1945      Subjective:   History:  Mr. Jordi Bustos with Hughes Esophagus, GERD, COPD, Memory issues,  Bipolar,  previously seen by Dr Gabrielle Carrera in 2018 for tremors and RLS. For the past 6 months, patient noted numbness of both feet described as wearing socks in bottom of both feet, symmetric, constant  (-) pain  (-) weakness  (-) incontinence  (-) falls    (+) alcohol use 10 yrs ago  (+) diabetes - father    No more tremors after getting massage treatment. ROS:  Per HPI-  Otherwise the remainder of ROS was negative    Social Hx  Social History     Socioeconomic History    Marital status:      Spouse name: Not on file    Number of children: Not on file    Years of education: Not on file    Highest education level: Not on file   Tobacco Use    Smoking status: Former Smoker     Packs/day: 1.00     Years: 50.00     Pack years: 50.00     Last attempt to quit: 2014     Years since quittin.0    Smokeless tobacco: Never Used   Substance and Sexual Activity    Alcohol use: No    Drug use: Yes     Types: Marijuana     Comment: 1 every evening    Sexual activity: Not Currently       Meds:  Current Outpatient Medications on File Prior to Visit   Medication Sig Dispense Refill    sertraline (ZOLOFT) 50 mg tablet Take 1 Tab by mouth daily. 90 Tab 3    lithium carbonate 300 mg tablet Take 1 Tab by mouth daily. 90 Tab 3    clotrimazole-betamethasone (LOTRISONE) topical cream Apply  to affected area two (2) times a day.  omeprazole (PRILOSEC) 40 mg capsule Take 40 mg by mouth Daily (before breakfast).  COCONUT OIL PO Take  by mouth. 7.5 mL by mouth each morning, 7.5 mL at midday, 5 mL at dinner and 5 mL at bedtime      multivitamin (ONE A DAY) tablet Take 1 Tab by mouth every evening.  Aspirin, Buffered 81 mg tab Take 81 mg by mouth daily.       primidone (MYSOLINE) 50 mg tablet TAKE 1 TAB BY MOUTH DAILY(WITH DINNER). 60 Tab 5     No current facility-administered medications on file prior to visit. Imaging:    CT Results (recent):  Results from Hospital Encounter encounter on 10/24/18   CT LOW DOSE LUNG CANCER SCREENING    Narrative EXAM:  CT CHEST WITHOUT CONTRAST     INDICATION:  History of smoking. COMPARISON: 9/13/2016. CONTRAST: None. TECHNIQUE: Low dose unenhanced multislice helical CT was performed from the  thoracic inlet to the adrenal glands without intravenous contrast  administration. Contiguous 1.25 mm axial images were reconstructed and lung and  soft tissue windows were generated. Coronal and sagittal reformations and axial  MIP images were generated. CT dose reduction was achieved through use of a  standardized protocol tailored for this examination and automatic exposure  control for dose modulation. DOSE: CTDIvol  2.69 mGy    FINDINGS:    There is a 4 mm pulmonary nodule in the lingula on image 165. Jonne Leader There is an  unchanged 5 mm pulmonary nodule in the right middle lobe on image 166. There is no pleural effusion or pneumothorax. The absence of intravenous contrast reduces the sensitivity for evaluation of  the mediastinum and upper abdominal organs. The aorta has a normal contour with  no evidence of aneurysm. No mediastinal or hilar or axillary adenopathy is  appreciated. The bones and soft tissues of the chest wall are within normal  limits. Several calcifications in the liver likely related to prior  granulomatous disease. There is a small hiatal hernia. .      Impression IMPRESSION: 4 mm nodule in the lingula. 5 mm pulmonary nodule in the right  middle lobe.  These are unchanged since at least 2016 and are likely incidental.    Lung-RADS Category: 2-benign appearance or behavior  Management recommendation: Consider annual screening low-dose CT.    [email or web address]         MRI Results (recent):  Results from Hospital Encounter encounter on 03/24/15 MRI BRAIN W WO CONT    Narrative **Final Report**       ICD Codes / Adm. Diagnosis: 345.50  786.09 / Abilio Haeys (focal) (    Examination:  MR BRAIN W AND WO CON  - [id number] - Mar 24 2015  3:32PM  Accession No:  [id number]  Reason:  epilepsy      REPORT:  INDICATION:  epilepsy     COMPARISON:  None    TECHNIQUE:  MR imaging of the brain was performed per seizure protocol   including sagittal T1, coronal high resolution T2 , axial T1, T2, FLAIR,   DWI/ADC, GRE. Pre and post contrast imaging was performed using 7 mL of   Gadavist.    FINDINGS:      The ventricles are midline without hydrocephalus. There is no acute intra   or extra-axial fluid collection. There is mild chronic microvascular   ischemic disease in the periventricular regions of both cerebral   hemispheres. The hippocampal regions are symmetric. There is no evidence of   cortical dysplasia or heterotopia. The major intracranial vascular   flow-voids are patent. There is no abnormal parenchymal or meningeal   enhancement. IMPRESSION:  Chronic ischemic changes with no acute infarction. Symmetric appearance of   the temporal lobes. Signing/Reading Doctor: Mary Cristina ([id number])    Approved: Mary Cristina ([id number])  Mar 24 2015  3:40PM                                    IR Results (recent):  No results found for this or any previous visit. VAS/US Results (recent):  No results found for this or any previous visit. Reviewed records in [employer] and media tab today    Lab Review     No visits with results within 3 Month(s) from this visit.    Latest known visit with results is:   Office Visit on 09/26/2018   Component Date Value Ref Range Status    Lithium level 09/26/2018 0.6  0.6 - 1.2 mmol/L Final    Comment:                                  Detection Limit = 0.1                            <0.1 indicates None Detected      Urine Culture, Routine 09/26/2018 No growth   Final         Objective:       Exam:  Visit Vitals  /60 (BP 1 Location: Right arm, BP Patient Position: Sitting)   Pulse 70   Temp 97.8 °F (36.6 °C) (Oral)   Resp 16   Ht 5' 7\" (1.702 m)   Wt 71.7 kg (158 lb)   SpO2 98%   BMI 24.75 kg/m²     Gen: Awake, alert, follows commands  Appropriate appearance, normal speech. Oriented to all spheres. No visual field defect on confrontation exam.  Full eyes movement, with no nystagmus, no diplopia, no ptosis. Normal gag and swallow. All remaining cranial nerves were normal  Motor function: 5/5 in all extremities  Sensory: intact to LT, PP and JPS  DTRs + UE and knees, absent ankles (-) Babinski  Good FTN and HTS   Gait: Normal    Assessment:       ICD-10-CM ICD-9-CM    1. Paresthesia of both feet R20.2 782.0 EMG LIMITED      GLUCOSE, 2 HR, PP GLUCOLA      VITAMIN B12 & FOLATE      VITAMIN B6      TSH 3RD GENERATION      SPEP AND ИВАН, SERUM      SED RATE (ESR)      SHANE, DIRECT, W/REFLEX   2. Encounter for screening for diabetes mellitus  Z13.1 V77.1 GLUCOSE, 2 HR, PP GLUCOLA   3. Unspecified mononeuropathy of bilateral lower limbs  G57.93 355.8 VITAMIN B6       Evaluate for polyneuropathy more likely due to glucose intolerance and chronic lithium use    Plan:   1. Blood test for 2 hr OGTT, Vit B12, Folate, TSH, SPEP/ИВАН, SHANE, ESR, Vit B6  2. EMG/NCS of both LE  3. Reviewed medical records in [employer]  4. Of note, patient leaving for [facility] at the end of Nov and will be staying there for 2 yrs. Follow-up and Dispositions    · Return for review of results.                Karina Jean MD  Diplomate, American Board of Psychiatry and Neurology  Diplomate, Neuromuscular Medicine  Diplomate, American Board of Electrodiagnostic Medicine

## 2019-10-07 NOTE — Clinical Note
10/7/19 Patient: Fransisco Oro YOB: 1945 Date of Visit: 10/7/2019 Taylor Duron MD 
VIA Dear Taylor Duron MD, Thank you for referring Mr. Uzair Rushing to [facility] for evaluation. My notes for this consultation are attached. If you have questions, please do not hesitate to call me. I look forward to following your patient along with you. Sincerely, Rajeev Ruiz MD

## 2019-10-07 NOTE — PROGRESS NOTES
Chief Complaint   Patient presents with    Neurologic Problem     Patient here today c/o \"odd feeling\" in the soles of his feet.      Visit Vitals  /60 (BP 1 Location: Right arm, BP Patient Position: Sitting)   Pulse 70   Temp 97.8 °F (36.6 °C) (Oral)   Resp 16   Ht 5' 7\" (1.702 m)   Wt 71.7 kg (158 lb)   SpO2 98%   BMI 24.75 kg/m²

## 2019-10-08 ENCOUNTER — HOSPITAL ENCOUNTER (OUTPATIENT)
Dept: LAB | Age: 74
Discharge: HOME | End: 2019-10-08
Payer: MEDICARE

## 2019-10-08 ENCOUNTER — OFFICE VISIT (OUTPATIENT)
Dept: FAMILY MEDICINE CLINIC | Age: 74
End: 2019-10-08

## 2019-10-08 VITALS
TEMPERATURE: 98.1 F | HEART RATE: 57 BPM | RESPIRATION RATE: 16 BRPM | WEIGHT: 155.6 LBS | SYSTOLIC BLOOD PRESSURE: 121 MMHG | BODY MASS INDEX: 24.42 KG/M2 | DIASTOLIC BLOOD PRESSURE: 70 MMHG | OXYGEN SATURATION: 100 % | HEIGHT: 67 IN

## 2019-10-08 DIAGNOSIS — M72.0 DUPUYTREN'S CONTRACTURE OF HAND: ICD-10-CM

## 2019-10-08 DIAGNOSIS — D64.9 ANEMIA, UNSPECIFIED TYPE: ICD-10-CM

## 2019-10-08 DIAGNOSIS — Z13.6 SCREENING FOR CARDIOVASCULAR CONDITION: ICD-10-CM

## 2019-10-08 DIAGNOSIS — F31.31 BIPOLAR AFFECTIVE DISORDER, CURRENTLY DEPRESSED, MILD (HCC): ICD-10-CM

## 2019-10-08 DIAGNOSIS — Z23 ENCOUNTER FOR IMMUNIZATION: ICD-10-CM

## 2019-10-08 DIAGNOSIS — G62.9 NEUROPATHY: ICD-10-CM

## 2019-10-08 DIAGNOSIS — Z12.11 SCREEN FOR COLON CANCER: ICD-10-CM

## 2019-10-08 DIAGNOSIS — Z13.39 SCREENING FOR ALCOHOLISM: ICD-10-CM

## 2019-10-08 DIAGNOSIS — G62.9 NEUROPATHY: Primary | ICD-10-CM

## 2019-10-08 DIAGNOSIS — R73.01 ELEVATED FASTING GLUCOSE: ICD-10-CM

## 2019-10-08 DIAGNOSIS — G62.9 POLYNEUROPATHY: ICD-10-CM

## 2019-10-08 DIAGNOSIS — Z00.00 MEDICARE ANNUAL WELLNESS VISIT, SUBSEQUENT: Primary | ICD-10-CM

## 2019-10-08 DIAGNOSIS — Z71.89 ADVANCED DIRECTIVES, COUNSELING/DISCUSSION: ICD-10-CM

## 2019-10-08 PROCEDURE — 82607 VITAMIN B-12: CPT

## 2019-10-08 PROCEDURE — 85027 COMPLETE CBC AUTOMATED: CPT

## 2019-10-08 PROCEDURE — 80178 ASSAY OF LITHIUM: CPT

## 2019-10-08 PROCEDURE — 86038 ANTINUCLEAR ANTIBODIES: CPT

## 2019-10-08 PROCEDURE — 82728 ASSAY OF FERRITIN: CPT

## 2019-10-08 PROCEDURE — 36415 COLL VENOUS BLD VENIPUNCTURE: CPT

## 2019-10-08 PROCEDURE — 85651 RBC SED RATE NONAUTOMATED: CPT

## 2019-10-08 PROCEDURE — 84443 ASSAY THYROID STIM HORMONE: CPT

## 2019-10-08 PROCEDURE — 82950 GLUCOSE TEST: CPT

## 2019-10-08 PROCEDURE — 84165 PROTEIN E-PHORESIS SERUM: CPT

## 2019-10-08 PROCEDURE — 84207 ASSAY OF VITAMIN B-6: CPT

## 2019-10-08 PROCEDURE — 86225 DNA ANTIBODY NATIVE: CPT

## 2019-10-08 NOTE — PATIENT INSTRUCTIONS
Medicare Wellness Visit, Male The best way to live healthy is to have a lifestyle where you eat a well-balanced diet, exercise regularly, limit alcohol use, and quit all forms of tobacco/nicotine, if applicable. Regular preventive services are another way to keep healthy. Preventive services (vaccines, screening tests, monitoring & exams) can help personalize your care plan, which helps you manage your own care. Screening tests can find health problems at the earliest stages, when they are easiest to treat. [address] [facility] follows the current, evidence-based guidelines published by the [facility] ([facility]) when recommending preventive services for our patients. Because we follow these guidelines, sometimes recommendations change over time as research supports it. (For example, a prostate screening blood test is no longer routinely recommended for men with no symptoms.) Of course, you and your doctor may decide to screen more often for some diseases, based on your risk and co-morbidities (chronic disease you are already diagnosed with). Preventive services for you include: - Medicare offers their members a free annual wellness visit, which is time for you and your primary care provider to discuss and plan for your preventive service needs. Take advantage of this benefit every year! 
-All adults over age 72 should receive the recommended pneumonia vaccines. Current USPSTF guidelines recommend a series of two vaccines for the best pneumonia protection.  
-All adults should have a flu vaccine yearly and an ECG.  All adults age 61 and older should receive a shingles vaccine once in their lifetime.   
-All adults age 38-68 who are overweight should have a diabetes screening test once every three years.  
-Other screening tests & preventive services for persons with diabetes include: an eye exam to screen for diabetic retinopathy, a kidney function test, a foot exam, and stricter control over your cholesterol.  
-Cardiovascular screening for adults with routine risk involves an electrocardiogram (ECG) at intervals determined by the provider.  
-Colorectal cancer screening should be done for adults age 54-65 with no increased risk factors for colorectal cancer. There are a number of acceptable methods of screening for this type of cancer. Each test has its own benefits and drawbacks. Discuss with your provider what is most appropriate for you during your annual wellness visit. The different tests include: colonoscopy (considered the best screening method), a fecal occult blood test, a fecal DNA test, and sigmoidoscopy. 
-All adults born between [facility] should be screened once for Hepatitis C. 
-An Abdominal Aortic Aneurysm (AAA) Screening is recommended for men age 73-68 who has ever smoked in their lifetime. Here is a list of your current Health Maintenance items (your personalized list of preventive services) with a due date: 
Health Maintenance Due Topic Date Due  Shingles Vaccine (1 of 2) 07/28/1996  AAA Screening  07/28/2011  Stool testing for trace blood  10/02/2018  Flu Vaccine  08/01/2019 [address] [place] [address] Annual Well Visit  09/27/2019 Vaccine Information Statement Influenza (Flu) Vaccine (Inactivated or Recombinant): What You Need to Know Many Vaccine Information Statements are available in [place] citizen and other languages. See www.immunize.org/vis Hojas de información sobre vacunas están disponibles en español y en muchos otros idiomas. Visite www.immunize.org/vis 1. Why get vaccinated? Influenza vaccine can prevent influenza (flu). Flu is a contagious disease that spreads around the [place] every year, usually between October and May. Anyone can get the flu, but it is more dangerous for some people.  Infants and young children, people 72years of age and older, pregnant women, and people with certain health conditions or a weakened immune system are at greatest risk of flu complications. Pneumonia, bronchitis, sinus infections and ear infections are examples of flu-related complications. If you have a medical condition, such as heart disease, cancer or diabetes, flu can make it worse. Flu can cause fever and chills, sore throat, muscle aches, fatigue, cough, headache, and runny or stuffy nose. Some people may have vomiting and diarrhea, though this is more common in children than adults. Each year thousands of people in the [facility] die from flu, and many more are hospitalized. Flu vaccine prevents millions of illnesses and flu-related visits to the doctor each year. 2. Influenza vaccines CDC recommends everyone 10months of age and older get vaccinated every flu season. Children 6 months through 6years of age may need 2 doses during a single flu season. Everyone else needs only 1 dose each flu season. It takes about 2 weeks for protection to develop after vaccination. There are many flu viruses, and they are always changing. Each year a new flu vaccine is made to protect against three or four viruses that are likely to cause disease in the upcoming flu season. Even when the vaccine doesnt exactly match these viruses, it may still provide some protection. Influenza vaccine does not cause flu. Influenza vaccine may be given at the same time as other vaccines. 3. Talk with your health care provider Tell your vaccine provider if the person getting the vaccine: 
 Has had an allergic reaction after a previous dose of influenza vaccine, or has any severe, life-threatening allergies.  Has ever had Guillain-Barré Syndrome (also called GBS). In some cases, your health care provider may decide to postpone influenza vaccination to a future visit. People with minor illnesses, such as a cold, may be vaccinated.  People who are moderately or severely ill should usually wait until they recover before getting influenza vaccine. Your health care provider can give you more information. 4. Risks of a reaction  Soreness, redness, and swelling where shot is given, fever, muscle aches, and headache can happen after influenza vaccine.  There may be a very small increased risk of Guillain-Barré Syndrome (GBS) after inactivated influenza vaccine (the flu shot). Deandraанна Paredes children who get the flu shot along with pneumococcal vaccine (PCV13), and/or DTaP vaccine at the same time might be slightly more likely to have a seizure caused by fever. Tell your health care provider if a child who is getting flu vaccine has ever had a seizure. People sometimes faint after medical procedures, including vaccination. Tell your provider if you feel dizzy or have vision changes or ringing in the ears. As with any medicine, there is a very remote chance of a vaccine causing a severe allergic reaction, other serious injury, or death. 5. What if there is a serious problem? An allergic reaction could occur after the vaccinated person leaves the clinic. If you see signs of a severe allergic reaction (hives, swelling of the face and throat, difficulty breathing, a fast heartbeat, dizziness, or weakness), call 9-1-1 and get the person to the nearest hospital. 
 
For other signs that concern you, call your health care provider. Adverse reactions should be reported to the Vaccine Adverse Event Reporting System (VAERS). Your health care provider will usually file this report, or you can do it yourself. Visit the VAERS website at www.vaers. hhs.gov or call 0-456.112.1070. VAERS is only for reporting reactions, and VAERS staff do not give medical advice.  
 
6. The National Vaccine Injury Compensation Program 
 
The [facility] Vaccine Injury Compensation Program (VICP) is a federal program that was created to compensate people who may have been injured by certain vaccines. Visit the VICP website at www.hrsa.gov/vaccinecompensation or call 4-915.603.6432 to learn about the program and about filing a claim. There is a time limit to file a claim for compensation. 7. How can I learn more?  Ask your health care provider.  Call your local or state health department.  Contact the Centers for Disease Control and Prevention (CDC): 
- Call 6-946.921.8013 (1-800-CDC-INFO) or 
- Visit CDCs influenza website at www.cdc.gov/flu Vaccine Information Statement (Interim) Inactivated Influenza Vaccine 8/15/2019 
[address] [id number] [facility] [employer] Centers for Disease Control and Prevention Office Use Only Vaccine Information Statement Recombinant Zoster (Shingles) Vaccine, RZV: What you need to know Many Vaccine Information Statements are available in [place] and other languages. See www.immunize.org/vis Hojas de Información Sobre Vacunas están disponibles en Español y en muchos otros idiomas. Visite Jeremiah.si 1. Why get vaccinated? Shingles (also called herpes zoster, or just zoster) is a painful skin rash, often with blisters. Shingles is caused by the varicella zoster virus, the same virus that causes chickenpox. After you have chickenpox, the virus stays in your body and can cause shingles later in life. You cant catch shingles from another person. However, a person who has never had chickenpox (or chickenpox vaccine) could get chickenpox from someone with shingles. A shingles rash usually appears on one side of the face or body and heals within 2 to 4 weeks. Its main symptom is pain, which can be severe. Other symptoms can include fever, headache, chills, and upset stomach. Very rarely, a shingles infection can lead to pneumonia, hearing problems, blindness, brain inflammation (encephalitis), or death. For about 1 person in 5, severe pain can continue even long after the rash has cleared up. This long-lasting pain is called post-herpetic neuralgia (PHN). Shingles is far more common in people 48years of age and older than in younger people, and the risk increases with age. It is also more common in people whose immune system is weakened because of a disease such as cancer, or by drugs such as steroids or chemotherapy. At least 1 million people a year in the [facility] get shingles. 2. Shingles vaccine (recombinant) Recombinant shingles vaccine was approved by FDA in 2017 for the prevention of shingles. In clinical trials, it was more than 90% effective in preventing shingles. It can also reduce the likelihood of PHN. Two doses, 2 to 6 months apart, are recommended for adults 48 and older. This vaccine is also recommended for people who have already gotten the live shingles vaccine (Zostavax). There is no live virus in this vaccine. 3. Some people should not get this vaccine Tell your vaccine provider if you: 
 
 Have any severe, life-threatening allergies. A person who has ever had a life-threatening allergic reaction after a dose of recombinant shingles vaccine, or has a severe allergy to any component of this vaccine, may be advised not to be vaccinated. Ask your health care provider if you want information about vaccine components.  Are pregnant or breastfeeding. There is not much information about use of recombinant shingles vaccine in pregnant or nursing women. Your healthcare provider might recommend delaying vaccination.  Are not feeling well. If you have a mild illness, such as a cold, you can probably get the vaccine today. If you are moderately or severely ill, you should probably wait until you recover. Your doctor can advise you. 4. Risks of a vaccine reaction With any medicine, including vaccines, there is a chance of reactions. After recombinant shingles vaccination, a person might experience:  
 Pain, redness, soreness, or swelling at the site of the injection  Headache, muscle aches, fever, shivering, fatigue In clinical trials, most people got a sore arm with mild or moderate pain after vaccination, and some also had redness and swelling where they got the shot. Some people felt tired, had muscle pain, a headache, shivering, fever, stomach pain, or nausea. About 1 out of 6 people who got recombinant zoster vaccine experienced side effects that prevented them from doing regular activities. Symptoms went away on their own in about 2 to 3 days. Side effects were more common in younger people. You should still get the second dose of recombinant zoster vaccine even if you had one of these reactions after the first dose. Other things that could happen after this vaccine:  People sometimes faint after medical procedures, including vaccination. Sitting or lying down for about 15 minutes can help prevent fainting and injuries caused by a fall. Tell your provider if you feel dizzy or have vision changes or ringing in the ears.  Some people get shoulder pain that can be more severe and longer-lasting than routine soreness that can follow injections. This happens very rarely.  Any medication can cause a severe allergic reaction. Such reactions to a vaccine are estimated at about 1 in a million doses, and would happen within a few minutes to a few hours after the vaccination. As with any medicine, there is a very remote chance of a vaccine causing a serious injury or death. The safety of vaccines is always being monitored. For more information, visit: www.cdc.gov/vaccinesafety/  
 
5. What if there is a serious problem? What should I look for?  Look for anything that concerns you, such as signs of a severe allergic reaction, very high fever, or unusual behavior. Signs of a severe allergic reaction can include hives, swelling of the face and throat, difficulty breathing, a fast heartbeat, dizziness, and weakness. These would usually start a few minutes to a few hours after the vaccination. What should I do?  If you think it is a severe allergic reaction or other emergency that cant wait, call 9-1-1 or get to the nearest hospital. Otherwise, call your health care provider. Afterward, the reaction should be reported to the Vaccine Adverse Event Reporting System (VAERS). Your doctor should file this report, or you can do it yourself through the VAERS website at www.vaers. [facility].gov, or by calling 7-345.120.5771. VAERS does not give medical advice. 6. How can I learn more?  Ask your health care provider. He or she can give you the vaccine package insert or suggest other sources of information.  Call your local or state health department.  Contact the Centers for Disease Control and Prevention (CDC): 
- Call 5-271.839.6072 (1-800-CDC-INFO) or 
- Visit CDCs website at www.cdc.gov/vaccines Vaccine Information Statement Recombinant Zoster Vaccine 2/12/2018 Department of [employer] Centers for Disease Control and Prevention Office Use Only Colon Cancer Screening: Care Instructions Your Care Instructions Colorectal cancer occurs in the colon or rectum. That's the lower part of your digestive system. It is the second-leading cause of cancer deaths in the [place]. It often starts with small growths called polyps in the colon or rectum. Polyps are usually found with screening tests. Depending on the type of test, any polyps found may be removed during the tests. Colorectal cancer usually does not cause symptoms at first. But regular tests can help find it early, before it spreads and becomes harder to treat.  Experts advise routine tests for colon cancer for people starting at age 48. And they advise people with a higher risk of colon cancer to get tested sooner. Talk with your doctor about when you should start testing. Discuss which tests you need. Follow-up care is a key part of your treatment and safety. Be sure to make and go to all appointments, and call your doctor if you are having problems. It's also a good idea to know your test results and keep a list of the medicines you take. What are the main screening tests for colon cancer? · Stool tests. These include the fecal immunochemical test (FIT) and the fecal occult blood test (FOBT). These tests check stool samples for signs of cancer. If your test is positive, you will need to have a colonoscopy. · Sigmoidoscopy. This test lets your doctor look at the lining of your rectum and the lowest part of your colon. Your doctor uses a lighted tube called a sigmoidoscope. This test can't find cancers or polyps in the upper part of your colon. In some cases, polyps that are found can be removed. But if your doctor finds polyps, you will need to have a colonoscopy to check the upper part of your colon. · Colonoscopy. This test lets your doctor look at the lining of your rectum and your entire colon. The doctor uses a thin, flexible tool called a colonoscope. It can also be used to remove polyps or get a tissue sample (biopsy). What tests do you need? The following guidelines are for people age 48 and over who are not at high risk for colorectal cancer. You may have at least one of these tests as directed by your doctor. · Fecal immunochemical test (FIT) or fecal occult blood test (FOBT) every year · Sigmoidoscopy every 5 years · Colonoscopy every 10 years If you are age 68 to 80, you can work with your doctor to decide if screening is a good option. If you are age 80 or older, your doctor will likely advise that screening is not helpful. Talk with your doctor about when you need to be tested.  And discuss which tests are right for you. Your doctor may recommend earlier or more frequent testing if you: 
· Have had colorectal cancer before. · Have had colon polyps. · Have symptoms of colorectal cancer. These include blood in your stool and changes in your bowel habits. · Have a parent, brother or sister, or child with colon polyps or colorectal cancer. · Have a bowel disease. This includes ulcerative colitis and Crohn's disease. · Have a rare polyp syndrome that runs in families, such as familial adenomatous polyposis (FAP). · Have had radiation treatments to the belly or pelvis. When should you call for help? Watch closely for changes in your health, and be sure to contact your doctor if: 
  · You have any changes in your bowel habits.  
  · You have any problems. Where can you learn more? Go to http://rowena-jalen.info/. Enter M541 in the search box to learn more about \"Colon Cancer Screening: Care Instructions. \" Current as of: March 28, 2018 Content Version: 11.8 © 9602-4622 [employer]. Care instructions adapted under license by [employer] (which disclaims liability or warranty for this information). If you have questions about a medical condition or this instruction, always ask your healthcare professional. [address] any warranty or liability for your use of this information. Advance Care Planning: Care Instructions Your Care Instructions It can be hard to live with an illness that cannot be cured. But if your health is getting worse, you may want to make decisions about end-of-life care. Planning for the end of your life does not mean that you are giving up. It is a way to make sure that your wishes are met. Clearly stating your wishes can make it easier for your loved ones. Making plans while you are still able may also ease your mind and make your final days less stressful and more meaningful. Follow-up care is a key part of your treatment and safety. Be sure to make and go to all appointments, and call your doctor if you are having problems. It's also a good idea to know your test results and keep a list of the medicines you take. What can you do to plan for the end of life? · You can bring these issues up with your doctor. You do not need to wait until your doctor starts the conversation. You might start with \"I would not be willing to live with . Janae Snow \" When you complete this sentence it helps your doctor understand your wishes. · Talk openly and honestly with your doctor. This is the best way to understand the decisions you will need to make as your health changes. Know that you can always change your mind. · Ask your doctor about commonly used life-support measures. These include tube feedings, breathing machines, and fluids given through a vein (IV). Understanding these treatments will help you decide whether you want them. · You may choose to have these life-supporting treatments for a limited time. This allows a trial period to see whether they will help you. You may also decide that you want your doctor to take only certain measures to keep you alive. It is important to spell out these conditions so that your doctor and family understand them. · Talk to your doctor about how long you are likely to live. He or she may be able to give you an idea of what usually happens with your specific illness. · Think about preparing papers that state your wishes. This way there will not be any confusion about what you want. You can change your instructions at any time. Which papers should you prepare? Advance directives are legal papers that tell doctors how you want to be cared for at the end of your life. You do not need a  to write these papers. Ask your doctor or your state health department for information on how to write your advance directives.  They may have the forms for each of these types of papers. Make sure your doctor has a copy of these on file, and give a copy to a family member or close friend. · Consider a do-not-resuscitate order (DNR). This order asks that no extra treatments be done if your heart stops or you stop breathing. Extra treatments may include cardiopulmonary resuscitation (CPR), electrical shock to restart your heart, or a machine to breathe for you. If you decide to have a DNR order, ask your doctor to explain and write it. Place the order in your home where everyone can easily see it. · Consider a living will. A living will explains your wishes about life support and other treatments at the end of your life if you become unable to speak for yourself. Living sam tell doctors to use or not use treatments that would keep you alive. You must have one or two witnesses or a notary present when you sign this form. · Consider a durable power of  for health care. This allows you to name a person to make decisions about your care if you are not able to. Most people ask a close friend or family member. Talk to this person about the kinds of treatments you want and those that you do not want. Make sure this person understands your wishes. These legal papers are simple to change. Tell your doctor what you want to change, and ask him or her to make a note in your medical file. Give your family updated copies of the papers. Where can you learn more? Go to http://rowena-jalen.info/. Enter P184 in the search box to learn more about \"Advance Care Planning: Care Instructions. \" Current as of: November 17, 2016 Content Version: 11.3 © 9036-1918 [employer]. Care instructions adapted under license by [employer] (which disclaims liability or warranty for this information).  If you have questions about a medical condition or this instruction, always ask your healthcare professional. [employer] disclaims any warranty or liability for your use of this information.

## 2019-10-08 NOTE — PROGRESS NOTES
Nida Ramírez  69 y.o. male  1945  [address]  [id number]   [address]: Progress Note  Vijay Bartlett MD       Encounter Date: 10/8/2019    Chief Complaint   Patient presents with    Annual Wellness Visit     History of Present Illness   Nida Ramírez is a 69 y.o. male who presents to clinic today for the Subsequent Medicare Annual Wellness Visit providing Personalized Prevention Plan Services (PPPS) (Performed 12 months after initial AWV and PPPS )       Concerns today   (Patient understands that medical problems addressed today may incur additional cost as this is a preventive visit)  -Several concerns:  Bipolar: Followed by psycahiatry. Symptoms stable. On lithium and Zoloft. Denies any recent manic episodes. Denies severe depression. Denies suicidal ideations or hallucinations. Neuropathy: Previously evaluated by Dr. Zo Mathis. Feels as though this may be worsening. Etiology thus far has been unclear. Due for EMG. Contracture in hand: Gradually worsening. Will be out of the country for several years. Is interested in getting this addressed. Makes it difficult to extend his hands. No known prior trauma. Anemia    Of note patient plans on moving [place] for several years the part of his half-way. Is interested in getting his health optimized before his departure    [facility] has established care with the following healthcare providers:  Patient Care Team:  Bryce Daigle MD as PCP - General (Family Practice)  Bryce Daigle MD as PCP - [facility] Empaneled Provider  Anurag Irizarry PsyD (Psychology)  David Sandoval MD (Endocrinology)  Diane Malave MD (Psychiatry)  Aguilar Cavazos MD (Neurology)  Deann Petersen MD (Gastroenterology)  Tabby Morales RN as Nurse Navigator  Gia Plaza MD as Physician (Sleep Medicine)  Anali Burciaga MD (Psychiatry)      Depression Risk Factor Screening:   Known history of bipolar depression. On lithium and Zoloft. Followed by psychiatry. Alcohol Risk Factor Screening: You do not drink alcohol or very rarely. Functional Ability and Level of Safety:     Hearing Loss   Hearing is good. Activities of Daily Living   Self-care. Requires assistance with: no ADLs    Fall Risk     Fall Risk Assessment, last 12 mths 10/7/2019   Able to walk? Yes   Fall in past 12 months? No       Patient is not abused      Advice/Referrals/Counseling (as indicated)   Education and counseling provided for any problems identified above: Immunizations, AAA screening, ACP    Preventive Services     Immunization History   Administered Date(s) Administered    Influenza High Dose Vaccine PF 09/01/2016, 09/20/2017    Influenza Vaccine (Tri) Adjuvanted 09/26/2018, 10/08/2019    Pneumococcal Conjugate (PCV-13) 07/28/2015    Pneumococcal Polysaccharide (PPSV-23) 07/01/2014    Tdap 07/01/2014    Varicella Virus Vaccine 07/28/2013     (Preventive care checklist to be included in patient instructions)  Discussed today Done Previously Not Needed     X  Pneumococcal vaccines    X  Flu vaccine     X Hepatitis B vaccine (if at risk)   X   Shingles vaccine    X  TDAP vaccine     X Digital rectal exam   X X  PSA      Colorectal cancer screening    X  Low-dose CT for lung cancer screening     X Bone density test    X  Glaucoma screening      Cholesterol test      Diabetes screening test       Diabetes self-management class      Nutritionist referral for diabetes or renal disease     Discussion of Advance Directive   Discussed with Alfreda Holman his ability to prepare and advance directive in the case that an injury or illness causes him to be unable to make health care decisions. Review of Systems   Review of Systems   Constitutional: Negative for chills and fever. HENT: Negative for congestion and sore throat.     Eyes: Negative for blurred vision and double vision. Respiratory: Negative for cough, shortness of breath and wheezing. Cardiovascular: Negative for chest pain and palpitations. Gastrointestinal: Negative for abdominal pain, constipation, diarrhea and nausea. Genitourinary: Negative for dysuria and hematuria. Musculoskeletal: Negative for back pain, falls and myalgias. Contracture   Skin: Negative for rash. Neurological: Positive for tingling. Negative for dizziness, tremors and headaches. Psychiatric/Behavioral: Negative for depression, hallucinations, substance abuse and suicidal ideas. The patient is not nervous/anxious. All other systems reviewed and are negative. Vitals/Objective:     Vitals:    10/08/19 0949   BP: 121/70   Pulse: (!) 57   Resp: 16   Temp: 98.1 °F (36.7 °C)   TempSrc: Oral   SpO2: 100%   Weight: 155 lb 9.6 oz (70.6 kg)   Height: 5' 7\" (1.702 m)     Body mass index is 24.37 kg/m². General: Patient alert and oriented and in NAD  Heart: Regular rate and rhythm, No murmurs, rubs or gallops. 2+ peripheral pulses  Lungs: Clear to auscultation bilaterally, no wheezing, rales or rhonchi  Abd: +BS, non-tender, non-distended  Ext: No edema  MSK: Depuytren's contracture. Neuro: Decreased sensation B/L LE. Strength normal.   Skin: No rashes or lesions noted on exposed skin,  Psych: Appropriate mood and affect    Assessment and Plan:   1. Medicare annual wellness visit, subsequent  Emily León was counseled on age-appropriate/ guideline-based risk prevention behaviors and screening for a 69y.o. year old   male . We also discussed adjustments in screening based on family history if necessary. Printed instructions for preventative screening guidelines and healthy behaviors given to patient with after visit summary. 2. Advanced directives, counseling/discussion  See ACP note    3.  Screening for alcoholism  - [place] ANNUAL ALCOHOL SCREEN 15 MIN    4. Screening for depression  Patient with hx of Bipolar on treatement    5. Screening for cardiovascular condition  Social History     Tobacco Use   Smoking Status Former Smoker    Packs/day: 1.00    Years: 50.00    Pack years: 50.00    Last attempt to quit: 2014    Years since quittin.1   Smokeless Tobacco Never Used   - US EXAM SCREENING AAA; Future    6. Encounter for immunization  - ADMIN INFLUENZA VIRUS VAC  - INFLUENZA VACCINE INACTIVATED (IIV), SUBUNIT, ADJUVANTED, IM    7. Screen for colon cancer  - OCCULT BLOOD IMMUNOASSAY,DIAGNOSTIC    8. Bipolar affective disorder, currently depressed, mild ([address]  Checking lithium level and will send result to PCP  - LITHIUM    9. Neuropathy  Following with neurology. 10. Anemia, unspecified type  Checking CBC and iron levels. - CBC W/O DIFF  - FERRITIN    11. Dupuytren's contracture of hand  - REFERRAL TO ORTHOPEDICS      I have discussed the diagnosis with the patient and the intended plan as seen in the above orders. he has expressed understanding. The patient has received an after-visit summary and questions were answered concerning future plans. I have discussed medication side effects and warnings with the patient as well. Follow-up and Dispositions  ·   Return in about 1 year (around 10/8/2020), or if symptoms worsen or fail to improve. Electronically Signed: Ousmane Simon MD     History   Patients past medical, surgical and family histories were reviewed and updated.     Past Medical History:   Diagnosis Date    Alzheimer disease ([address] 2016    Hughes's esophagus     Hughes's esophagus with esophagitis     [facility] disorder [facility]     CAD (coronary artery disease)     minor calcification in heart    Chronic obstructive pulmonary disease (HCC)     mild    Diverticulitis     Essential tremor     Prostate cancer ([address] 2004    prostatectomy    Psychotic disorder ([address]     Restless leg syndrome      Past Surgical History:   Procedure Laterality Date    HX HEENT deviated septum repair    HX PROSTATECTOMY  2004    HX TONSILLECTOMY       Family History   Problem Relation Age of Onset    Diabetes Father     Cancer Maternal Grandmother         stomach cancer    Heart Disease Maternal Grandmother     Stroke Maternal Grandmother     Cancer Other     Heart Disease Other      Social History     Socioeconomic History    Marital status:      Spouse name: Not on file    Number of children: Not on file    Years of education: Not on file    Highest education level: Not on file   Occupational History    Not on file   Social Needs    Financial resource strain: Not on file    Food insecurity:     Worry: Not on file     Inability: Not on file    Transportation needs:     Medical: Not on file     Non-medical: Not on file   Tobacco Use    Smoking status: Former Smoker     Packs/day: 1.00     Years: 50.00     Pack years: 50.00     Last attempt to quit: 2014     Years since quittin.0    Smokeless tobacco: Never Used   Substance and Sexual Activity    Alcohol use: No    Drug use: Yes     Types: Marijuana     Comment: 1 every evening    Sexual activity: Not Currently   Lifestyle    Physical activity:     Days per week: Not on file     Minutes per session: Not on file    Stress: Not on file   Relationships    Social connections:     Talks on phone: Not on file     Gets together: Not on file     Attends Jain service: Not on file     Active member of club or organization: Not on file     Attends meetings of clubs or organizations: Not on file     Relationship status: Not on file    Intimate partner violence:     Fear of current or ex partner: Not on file     Emotionally abused: Not on file     Physically abused: Not on file     Forced sexual activity: Not on file   Other Topics Concern    Not on file   Social History Narrative    Not on file            Current Medications/Allergies     Current Outpatient Medications   Medication Sig Dispense Refill    varicella-zoster recombinant, PF, (SHINGRIX, PF,) 50 mcg/0.5 mL susr injection 0.5mL by IntraMUSCular route once now and then repeat in 2-6 months 0.5 mL 1    sertraline (ZOLOFT) 50 mg tablet Take 1 Tab by mouth daily. 90 Tab 3    lithium carbonate 300 mg tablet Take 1 Tab by mouth daily. 90 Tab 3    primidone (MYSOLINE) 50 mg tablet TAKE 1 TAB BY MOUTH DAILY(WITH DINNER). 60 Tab 5    clotrimazole-betamethasone (LOTRISONE) topical cream Apply  to affected area two (2) times a day.  omeprazole (PRILOSEC) 40 mg capsule Take 40 mg by mouth Daily (before breakfast).  COCONUT OIL PO Take  by mouth. 7.5 mL by mouth each morning, 7.5 mL at midday, 5 mL at dinner and 5 mL at bedtime      multivitamin (ONE A DAY) tablet Take 1 Tab by mouth every evening.  Aspirin, Buffered 81 mg tab Take 81 mg by mouth daily.        Allergies   Allergen Reactions    Sulfa (Sulfonamide Antibiotics) Nausea and Vomiting     Oral only

## 2019-10-08 NOTE — ACP (ADVANCE CARE PLANNING)
Advance Care Planning    Advance Care Planning (ACP) Provider Conversation Snapshot    Date of ACP Conversation: 11/14/19  Persons included in Conversation:  patient  Length of ACP Conversation in minutes:  <16 minutes (Non-Billable)    Authorized Decision Maker (if patient is incapable of making informed decisions): This person is:   Healthcare Agent/Medical Power of  under Advance Directive            For Patients with Decision Making Capacity:   Values/Goals: Exploration of values, goals, and preferences if recovery is not expected, even with continued medical treatment in the event of:  Imminent death  Severe, permanent brain injury  \"In these circumstances, what matters most to you? \"  Care focused more on comfort or quality of life.     Conversation Outcomes / Follow-Up Plan:   Reviewed existing Advance Directive   Recommended communicating the plan and making copies for the healthcare agent, personal physician, and others as appropriate (e.g., health system)  Recommended review of completed ACP document annually or upon change in health status     Full Code    Extended Emergency Contact Information  Primary Emergency Contact: Corrine Rothman  Address: [address] Phone: [phone]  Mobile Phone: [phone]  Relation: Spouse  Secondary Emergency Contact: Chayito Leo  [place] Phone: [phone]  Relation: Spouse

## 2019-10-08 NOTE — PROGRESS NOTES
Chief Complaint   Patient presents with   [facility] Annual Wellness Visit     1. Have you been to the ER, urgent care clinic since your last visit? Hospitalized since your last visit?no    2. Have you seen or consulted any other health care providers outside of the [address] [place] [address] since your last visit? Include any pap smears or colon screening. No    Saw floranda possible periphereal neuropathy---needs an A1C checked    Complaining of some numbness on bottom of feet--started having sx 8-9 months ago    Encounter for flu vaccine    Colonoscopy referral---will be going out of country for 2 years      General Health Questions   -During the past 4 weeks:   -how would you rate your health in general? Good   -how often have you been bothered by feeling dizzy when standing up? -how much have you been bothered by bodily pain? not   -Have you noticed any hearing difficulties? no   -has your physical and emotional health limited your social activities with family or friends? no    Emotional Health Questions   -Do you have a history of depression, anxiety, or emotional problems? yes  -Over the past 2 weeks, have you felt down, depressed or hopeless? yes  -Over the past 2 weeks, have you felt little interest or pleasure in doing things? no    Health Habits   Please describe your diet habits: little meat, 2 meals a day, few sweets  Do you get 5 servings of fruits or vegetables daily? no  Do you exercise regularly? no    Activities of Daily Living and Functional Status   -Do you need help with eating, walking, dressing, bathing, toileting, the phone, transportation, shopping, preparing meals, housework, laundry, medications or managing money? no  -In the past four weeks, was someone available to help you if you needed and wanted help with anything? yes  -Are you confident are you that you can control and manage most of your health problems? yes  -Have you been given information to help you keep track of your medications? yes  -How often do you have trouble taking your medications as prescribed? never    Fall Risk and Home Safety   Have you fallen 2 or more times in the past year? no  Does your home have rugs in the hallways? no, Do you have grab bars in the bathrooms? yes, Does your home have handrails on the stairs? yes, Do you have adequate lighting in your home? yes  Do you have smoke detectors and check them regularly?  yes  Do you have difficulties driving a car/vehicle? no  Do you always fasten your seat belt when you are in a car? yes

## 2019-10-09 ENCOUNTER — HOSPITAL ENCOUNTER (OUTPATIENT)
Dept: ULTRASOUND IMAGING | Age: 74
Discharge: HOME | End: 2019-10-09
Attending: FAMILY MEDICINE
Payer: MEDICARE

## 2019-10-09 DIAGNOSIS — Z13.6 SCREENING FOR CARDIOVASCULAR CONDITION: ICD-10-CM

## 2019-10-09 LAB
ERYTHROCYTE [DISTWIDTH] IN BLOOD BY AUTOMATED COUNT: 12.4 % (ref 12.3–15.4)
FERRITIN SERPL-MCNC: 33 NG/ML (ref 30–400)
HCT VFR BLD AUTO: 36.8 % (ref 37.5–51)
HGB BLD-MCNC: 12.5 G/DL (ref 13–17.7)
LITHIUM SERPL-SCNC: 0.1 MMOL/L (ref 0.6–1.2)
MCH RBC QN AUTO: 33.8 PG (ref 26.6–33)
MCHC RBC AUTO-ENTMCNC: 34 G/DL (ref 31.5–35.7)
MCV RBC AUTO: 100 FL (ref 79–97)
PLATELET # BLD AUTO: 217 X10E3/UL (ref 150–450)
RBC # BLD AUTO: 3.7 X10E6/UL (ref 4.14–5.8)
WBC # BLD AUTO: 6.5 X10E3/UL (ref 3.4–10.8)

## 2019-10-09 PROCEDURE — 76706 US ABDL AORTA SCREEN AAA: CPT

## 2019-10-12 LAB
ALBUMIN SERPL ELPH-MCNC: 3.6 G/DL (ref 2.9–4.4)
ALBUMIN/GLOB SERPL: 1.4 {RATIO} (ref 0.7–1.7)
ALPHA1 GLOB SERPL ELPH-MCNC: 0.2 G/DL (ref 0–0.4)
ALPHA2 GLOB SERPL ELPH-MCNC: 0.7 G/DL (ref 0.4–1)
ANA SER QL: POSITIVE
B-GLOBULIN SERPL ELPH-MCNC: 0.8 G/DL (ref 0.7–1.3)
CENTROMERE B AB SER-ACNC: <0.2 AI (ref 0–0.9)
CHROMATIN AB SERPL-ACNC: <0.2 AI (ref 0–0.9)
DSDNA AB SER-ACNC: <1 IU/ML (ref 0–9)
ENA JO1 AB SER-ACNC: <0.2 AI (ref 0–0.9)
ENA RNP AB SER-ACNC: <0.2 AI (ref 0–0.9)
ENA SCL70 AB SER-ACNC: <0.2 AI (ref 0–0.9)
ENA SM AB SER-ACNC: <0.2 AI (ref 0–0.9)
ENA SS-A AB SER-ACNC: <0.2 AI (ref 0–0.9)
ENA SS-B AB SER-ACNC: >8 AI (ref 0–0.9)
ERYTHROCYTE [SEDIMENTATION RATE] IN BLOOD BY WESTERGREN METHOD: 5 MM/HR (ref 0–30)
FOLATE SERPL-MCNC: >20 NG/ML
GAMMA GLOB SERPL ELPH-MCNC: 0.8 G/DL (ref 0.4–1.8)
GLOBULIN SER CALC-MCNC: 2.6 G/DL (ref 2.2–3.9)
GLUCOSE 2H P 75 G GLC PO SERPL-MCNC: 125 MG/DL (ref 65–139)
GLUCOSE P FAST SERPL-MCNC: 79 MG/DL (ref 65–99)
M PROTEIN SERPL ELPH-MCNC: 0.5 G/DL
PLEASE NOTE, 011150: ABNORMAL
PROT SERPL-MCNC: 6.2 G/DL (ref 6–8.5)
SEE BELOW, 164869: ABNORMAL
TSH SERPL DL<=0.005 MIU/L-ACNC: 1.29 UIU/ML (ref 0.45–4.5)
VIT B12 SERPL-MCNC: 607 PG/ML (ref 232–1245)
VIT B6 SERPL-MCNC: 19.2 UG/L (ref 5.3–46.7)

## 2019-10-14 ENCOUNTER — OFFICE VISIT (OUTPATIENT)
Dept: NEUROLOGY | Age: 74
End: 2019-10-14

## 2019-10-14 ENCOUNTER — TELEPHONE (OUTPATIENT)
Dept: FAMILY MEDICINE CLINIC | Age: 74
End: 2019-10-14

## 2019-10-14 DIAGNOSIS — R20.2 PARESTHESIA OF BOTH FEET: Primary | ICD-10-CM

## 2019-10-14 NOTE — LETTER
10/14/2019 1:01 PM 
 
Patient:  Martin Cox YOB: 1945 Date of Visit: 10/14/2019 Dear Kadi Haskins MD 
[address] [address] [address] VIA In Basket 
 : Thank you for referring Mr. Mark Valencia to me for EMG/NCS. EMG/ NCS Report [id number] [address] Ph: [phone] FAX: [phone] Test Date:  10/14/2019 Patient: Laurita Rodriguez : 1945 Physician: Jimi Pandya MD  
Sex: Male Height: ' \" Ref Phys: Anca Campos MD  
ID#: [id number] Weight:  lbs. Technician: Chelsy Ling Patient History / Exam: 
Patient is coming for polyneuropathy evaluation. Mr. Eliezer Davidson with Hughes Esophagus, GERD, COPD, Memory issues,  Bipolar,  previously seen by Dr Lisette Pathak in 2018 for tremors and RLS.   
For the past 6 months, patient noted numbness of both feet described as wearing socks in bottom of both feet, symmetric, constant Exam: Patient awake, alert, follows commands, clear speech; hearing grossly intact; EOMI, (-) facial asymmetry, tongue midline; Motor function: 5/5 in all extremities Sensory: intact to LT, PP and JPS DTRs + UE and knees, absent ankles (-) Babinski Good FTN and HTS Gait: Normal 
 
 
 
EMG & NCV Findings: 
Evaluation of the Left Fibular motor and the Right Fibular motor nerves showed normal distal onset latency (L4.6, R4.7 ms), normal amplitude (L2.2, R3.4 mV), normal conduction velocity (B Fib-Ankle, L39, R39 m/s), and normal conduction velocity (Poplt-B Fib, L43, R53 m/s). The Left tibial motor and the Right tibial motor nerves showed normal distal onset latency (L4.5, R4.9 ms), normal amplitude (L9.3, R9.0 mV), and normal conduction velocity (Knee-Ankle, L40, R39 m/s). The Left Sup Fibular sensory and the Right Sup Fibular sensory nerves showed no response (Lower leg). The Left sural sensory and the Right sural sensory nerves showed no response (Calf). F Wave studies indicate that the Right tibial F wave has prolonged latency (59.18 ms). All remaining F Wave latencies were within normal limits. H-reflex studies indicate that the Left tibial H-reflex has no response. The Right tibial H-reflex has no response. All examined muscles (as indicated in the following table) showed no evidence of electrical instability. 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 Chelsie Ang MD 
Diplomate, American Board of Psychiatry and Neurology Diplomate, Neuromuscular Medicine Diplomate, [address] [place] [address] Board of Electrodiagnostic Medicine Director, [address] [place] [address] Accredited Laboratory with Exemplary Status Nerve Conduction Studies Anti Sensory Summary Table Site NR Peak (ms) Norm Peak (ms) P-T Amp (µV) Norm P-T Amp Site1 Site2 Dist (cm) Left Sup Fibular Anti Sensory (Lat ankle)  29.7°C Lower leg NR  <4.6  >4 Lower leg Lat ankle 10.0 Right Sup Fibular Anti Sensory (Lat ankle)  29°C Lower leg NR  <4.6  >4 Lower leg Lat ankle 10.0 Left Sural Anti Sensory (Lat Mall)  30.8°C Calf NR  <4.5  >4.0 Calf Lat Mall 14.0 Right Sural Anti Sensory (Lat Mall)  29.2°C Calf NR  <4.5  >4.0 Calf Lat Mall 14.0 Motor Summary Table Site NR Onset (ms) Norm Onset (ms) O-P Amp (mV) Norm O-P Amp Amp% (Prev) Site1 Site2 Dist (cm) Mejia (m/s) Norm Mejia (m/s) Left Fibular Motor (Ext Dig Brev)  29.3°C Ankle    4.6 <6.5 2.2 >1.1 100.0 Ankle Ext Dig Brev 8.0 B Fib    12.2  2.4  109.1 B Fib Ankle 30.0 39 >38 Poplt    14.5  2.4  100.0 Poplt B Fib 10.0 43 >42 Right Fibular Motor (Ext Dig Brev)  29.1°C Ankle    4.7 <6.5 3.4 >1.1 100.0 Ankle Ext Dig Brev 8.0 B Fib    12.2  3.1  91.2 B Fib Ankle 29.5 39 >38 Poplt    14.1  3.1  100.0 Poplt B Fib 10.0 53 >42 Left Tibial Motor (Abd Calloway Brev)  29.1°C Ankle    4.5 <6.1 9.3 >1.1 100.0 Ankle Abd Calloway Brev 8.0 Knee    13.6  7.0  75.3 Knee Ankle 36.5 40 >39 Right Tibial Motor (Abd Calloway Brev)  29°C Ankle    4.9 <6.1 9.0 >1.1 100.0 Ankle Abd Calloway Brev 8.0 Knee    14.5  5.7  63.3 Knee Ankle 37.0 39 >39 F Wave Studies NR F-Lat (ms) Lat Norm (ms) L-R F-Lat (ms) L-R Lat Norm Left Tibial (Mrkrs) (Abd Hallucis)  29°C  
   54.67 <56 4.51 <5.7 Right Tibial (Mrkrs) (Abd Hallucis)  29°C  
   59.18 <56 4.51 <5.7 H Reflex Studies NR H-Lat (ms) L-R H-Lat (ms) L-R Lat Norm Left Tibial (Gastroc)  28.9°C NR   <2.0 Right Tibial (Gastroc)  29°C  
NR   <2.0 EMG Side Muscle Nerve Root Ins Act Fibs Psw Recrt Duration Amp Poly Comment Left Ext Dig Brev Dp Br Peron L5, S1 Nml Nml Nml Nml Nml Nml Nml Left AbdHallucis MedPlantar S1-2 Nml Nml Nml Nml Nml Nml Nml Left AntTibialis Dp Br Peron L4-5 Nml Nml Nml Nml Nml Nml Nml Left MedGastroc Tibial S1-2 Nml Nml Nml Nml Nml Nml Nml Left VastusLat Femoral L2-4 Nml Nml Nml Nml Nml Nml Nml Right Ext Dig Brev Dp Br Peron L5, S1 Nml Nml Nml Nml Nml Nml Nml Right AbdHallucis MedPlantar S1-2 Nml Nml Nml Nml Nml Nml Nml Right AntTibialis Dp Br Peron L4-5 Nml Nml Nml Nml Nml Nml Nml Right MedGastroc Tibial S1-2 Nml Nml Nml Nml Nml Nml Nml Right VastusLat Femoral L2-4 Nml Nml Nml Nml Nml Nml Nml Right GluteusMed SupGluteal L4-S1 Nml Nml Nml Nml Nml Nml Nml Right Lower Lumb Parasp Rami L5,S1 Nml Nml Nml Nml Nml Nml Nml Nerve Conduction Studies Anti Sensory Left/Right Comparison Site L Lat (ms) R Lat (ms) L-R Lat (ms) L Amp (µV) R Amp (µV) L-R Amp (%) Site1 Site2 L Mejia (m/s) R Mejia (m/s) L-R Mejia (m/s) Sup Fibular Anti Sensory (Lat ankle)  29.7°C Lower leg       Lower leg Lat ankle Sural Anti Sensory (Lat Mall)  30.8°C Calf       Calf Lat Mall Motor Left/Right Comparison Site L Lat (ms) R Lat (ms) L-R Lat (ms) L Amp (mV) R Amp (mV) L-R Amp (%) Site1 Site2 L Mejia (m/s) R Mejia (m/s) L-R Mejia (m/s) Fibular Motor (Ext Dig Brev)  29.3°C Ankle 4.6 4.7 0.1 2.2 3.4 35.3 Ankle Ext Dig Brev     
B Fib 12.2 12.2 0.0 2.4 3.1 22.6 B Fib Ankle 39 39 0 Poplt 14.5 14.1 0.4 2.4 3.1 22.6 Poplt B Fib 43 53 10 Tibial Motor (Abd Calloway Brev)  29.1°C Ankle 4.5 4.9 0.4 9.3 9.0 3.2 Ankle Abd Calloway Brev     
Knee 13.6 14.5 0.9 7.0 5.7 18.6 Knee Ankle 40 39 1 Waveforms:

## 2019-10-14 NOTE — TELEPHONE ENCOUNTER
----- Message from Theodor Drop sent at 10/14/2019 12:52 PM EDT -----  Regarding: Dr. Rohini Ash  General Message/Vendor Calls    Caller's first and last name:  pt    Reason for call:  Status of rheumatologist appt. Callback required yes/no and why:  yes    Best contact number(s):  (pt's wife Y)[phone], Mrs. Jeol Caldera    Details to clarify the request:  Inquiring the status of appt for rheumatologist. Pt stated, he spoke with the nurse in regards to this matter. Pt stated, he will be in the country until just before 12/25/2019.  Requesting to speak with the nurse in regards to this matbob     Cactusnaomi Celestin

## 2019-10-14 NOTE — TELEPHONE ENCOUNTER
Dr. Julia Worley   Received:  Today   Message Contents   Ej Foster [facility] Front Office             General Message/Vendor Calls     Caller's first and last name: Kathia Farley     Reason for call: Pt calling about upcoming referral for Dr. Dar Li, orthopedic surgery, ph [phone] fax [phone], appointment scheduled 10/16 at 2pm, insurance: medicare, reason for visit - contructure of ring finger and pinkie on left hand         Callback required yes/no and why: No       Best contact number(s): [phone]       Details to clarify the request:

## 2019-10-14 NOTE — PROGRESS NOTES
EMG/NCS done. See Procedure Note for results. 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  Will see if we can expedite (patient is planning to leave for [facility] by end of Nov)  2) Depending on above,will consider repeating SPEP and referral to hematology      Oksana Ha MD

## 2019-10-14 NOTE — PROCEDURES
EMG/ NCS Report  [facility], [address]  Ph: [phone]/ [phone]  FAX: [phone]/ [phone]  Test Date:  10/14/2019    Patient: Jose M Hurst : 1945 Physician: Kerri Liang MD   Sex: Male Height: ' \" Ref Phys: Taylor Palacios MD   ID#: [id number] Weight:  lbs. Technician: Elan Browning     Patient History / Exam:  Patient is coming for polyneuropathy evaluation. Mr. Cj Olea with Hughes Esophagus, GERD, COPD, Memory issues,  Bipolar,  previously seen by Dr Erika Lennon in 2018 for tremors and RLS.    For the past 6 months, patient noted numbness of both feet described as wearing socks in bottom of both feet, symmetric, constant      Exam: Patient awake, alert, follows commands, clear speech; hearing grossly intact; EOMI, (-) facial asymmetry, tongue midline; Motor function: 5/5 in all extremities  Sensory: intact to LT, PP and JPS  DTRs + UE and knees, absent ankles (-) Babinski  Good FTN and HTS   Gait: Normal        EMG & NCV Findings:  Evaluation of the Left Fibular motor and the Right Fibular motor nerves showed normal distal onset latency (L4.6, R4.7 ms), normal amplitude (L2.2, R3.4 mV), normal conduction velocity (B Fib-Ankle, L39, R39 m/s), and normal conduction velocity (Poplt-B Fib, L43, R53 m/s). The Left tibial motor and the Right tibial motor nerves showed normal distal onset latency (L4.5, R4.9 ms), normal amplitude (L9.3, R9.0 mV), and normal conduction velocity (Knee-Ankle, L40, R39 m/s). The Left Sup Fibular sensory and the Right Sup Fibular sensory nerves showed no response (Lower leg). The Left sural sensory and the Right sural sensory nerves showed no response (Calf). F Wave studies indicate that the Right tibial F wave has prolonged latency (59.18 ms). All remaining F Wave latencies were within normal limits. H-reflex studies indicate that the Left tibial H-reflex has no response. The Right tibial H-reflex has no response. All examined muscles (as indicated in the following table) showed no evidence of electrical instability. Impression:  ABNORMAL    Extensive electrodiagnostic examination of the right and left lower extremities shows the followin) Absent sensory responses in both lower extremities  2) Prolonged right Tibial F-wave response  3) Absent H-reflex bilaterally    These findings are consistent with a sensory polyneuropathy.       Rosalba Simons MD  Diplomate, American Board of Psychiatry and Neurology  Diplomate, Neuromuscular Medicine  Diplomate, American Board of Electrodiagnostic Medicine  Director, [address] [place] [address] Accredited Laboratory with Exemplary Status                  Nerve Conduction Studies  Anti Sensory Summary Table     Site NR Peak (ms) Norm Peak (ms) P-T Amp (µV) Norm P-T Amp Site1 Site2 Dist (cm)   Left Sup Fibular Anti Sensory (Lat ankle)  29.7°C   Lower leg NR  <4.6  >4 Lower leg Lat ankle 10.0   Right Sup Fibular Anti Sensory (Lat ankle)  29°C   Lower leg NR  <4.6  >4 Lower leg Lat ankle 10.0   Left Sural Anti Sensory (Lat Mall)  30.8°C   Calf NR  <4.5  >4.0 Calf Lat Mall 14.0   Right Sural Anti Sensory (Lat Mall)  29.2°C   Calf NR  <4.5  >4.0 Calf Lat Mall 14.0     Motor Summary Table     Site NR Onset (ms) Norm Onset (ms) O-P Amp (mV) Norm O-P Amp Amp% (Prev) Site1 Site2 Dist (cm) Mejia (m/s) Norm Mejia (m/s)   Left Fibular Motor (Ext Dig Brev)  29.3°C   Ankle    4.6 <6.5 2.2 >1.1 100.0 Ankle Ext Dig Brev 8.0     B Fib    12.2  2.4  109.1 B Fib Ankle 30.0 39 >38   Poplt    14.5  2.4  100.0 Poplt B Fib 10.0 43 >42   Right Fibular Motor (Ext Dig Brev)  29.1°C   Ankle    4.7 <6.5 3.4 >1.1 100.0 Ankle Ext Dig Brev 8.0     B Fib    12.2  3.1  91.2 B Fib Ankle 29.5 39 >38   Poplt    14.1  3.1  100.0 Poplt B Fib 10.0 53 >42   Left Tibial Motor (Abd Calloway Brev)  29.1°C   Ankle    4.5 <6.1 9.3 >1.1 100.0 Ankle Abd Calloway Brev 8.0     Knee    13.6  7.0  75.3 Knee Ankle 36.5 40 >39   Right Tibial Motor (Abd Itapebí Brev)  29°C   Ankle    4.9 <6.1 9.0 >1.1 100.0 Ankle Abd Calloway Brev 8.0     Knee    14.5  5.7  63.3 Knee Ankle 37.0 39 >39     F Wave Studies     NR F-Lat (ms) Lat Norm (ms) L-R F-Lat (ms) L-R Lat Norm   Left Tibial (Mrkrs) (Abd Hallucis)  29°C      54.67 <56 4.51 <5.7   Right Tibial (Mrkrs) (Abd Hallucis)  29°C      59.18 <56 4.51 <5.7     H Reflex Studies     NR H-Lat (ms) L-R H-Lat (ms) L-R Lat Norm   Left Tibial (Gastroc)  28.9°C   NR   <2.0   Right Tibial (Gastroc)  29°C   NR   <2.0     EMG     Side Muscle Nerve Root Ins Act Fibs Psw Recrt Duration Amp Poly Comment   Left Ext Dig Brev Dp Br Peron L5, S1 Nml Nml Nml Nml Nml Nml Nml    Left AbdHallucis MedPlantar S1-2 Nml Nml Nml Nml Nml Nml Nml    Left AntTibialis Dp Br Peron L4-5 Nml Nml Nml Nml Nml Nml Nml    Left MedGastroc Tibial S1-2 Nml Nml Nml Nml Nml Nml Nml    Left VastusLat Femoral L2-4 Nml Nml Nml Nml Nml Nml Nml    Right Ext Dig Brev Dp Br Peron L5, S1 Nml Nml Nml Nml Nml Nml Nml    Right AbdHallucis MedPlantar S1-2 Nml Nml Nml Nml Nml Nml Nml    Right AntTibialis Dp Br Peron L4-5 Nml Nml Nml Nml Nml Nml Nml    Right MedGastroc Tibial S1-2 Nml Nml Nml Nml Nml Nml Nml    Right VastusLat Femoral L2-4 Nml Nml Nml Nml Nml Nml Nml    Right GluteusMed SupGluteal L4-S1 Nml Nml Nml Nml Nml Nml Nml    Right Lower Lumb Parasp Rami L5,S1 Nml Nml Nml Nml Nml Nml Nml            Nerve Conduction Studies  Anti Sensory Left/Right Comparison     Site L Lat (ms) R Lat (ms) L-R Lat (ms) L Amp (µV) R Amp (µV) L-R Amp (%) Site1 Site2 L Mejia (m/s) R Mejia (m/s) L-R Mejia (m/s)   Sup Fibular Anti Sensory (Lat ankle)  29.7°C   Lower leg       Lower leg Lat ankle      Sural Anti Sensory (Lat Mall)  30.8°C   Calf       Calf Lat Mall        Motor Left/Right Comparison     Site L Lat (ms) R Lat (ms) L-R Lat (ms) L Amp (mV) R Amp (mV) L-R Amp (%) Site1 Site2 L Mejia (m/s) R Mejia (m/s) L-R Mejia (m/s)   Fibular Motor (Ext Dig Brev)  29.3°C   Ankle 4.6 4.7 0.1 2.2 3.4 35.3 Ankle Ext Dig Brev B Fib 12.2 12.2 0.0 2.4 3.1 22.6 B Fib Ankle 39 39 0   Poplt 14.5 14.1 0.4 2.4 3.1 22.6 Poplt B Fib 43 53 10   Tibial Motor (Abd Calloway Brev)  29.1°C   Ankle 4.5 4.9 0.4 9.3 9.0 3.2 Ankle Abd Calloway Brev      Knee 13.6 14.5 0.9 7.0 5.7 18.6 Knee Ankle 40 39 1         Waveforms:

## 2019-10-21 ENCOUNTER — OFFICE VISIT (OUTPATIENT)
Dept: RHEUMATOLOGY | Age: 74
End: 2019-10-21

## 2019-10-21 ENCOUNTER — HOSPITAL ENCOUNTER (OUTPATIENT)
Dept: LAB | Age: 74
Discharge: HOME | End: 2019-10-21
Payer: MEDICARE

## 2019-10-21 VITALS
TEMPERATURE: 98.7 F | HEART RATE: 63 BPM | RESPIRATION RATE: 18 BRPM | WEIGHT: 154.8 LBS | OXYGEN SATURATION: 96 % | SYSTOLIC BLOOD PRESSURE: 133 MMHG | HEIGHT: 67 IN | BODY MASS INDEX: 24.3 KG/M2 | DIASTOLIC BLOOD PRESSURE: 74 MMHG

## 2019-10-21 DIAGNOSIS — F31.9 BIPOLAR AFFECTIVE DISORDER, REMISSION STATUS UNSPECIFIED (HCC): ICD-10-CM

## 2019-10-21 DIAGNOSIS — R76.8 POSITIVE ANA (ANTINUCLEAR ANTIBODY): Primary | ICD-10-CM

## 2019-10-21 DIAGNOSIS — G62.9 POLYNEUROPATHY: ICD-10-CM

## 2019-10-21 DIAGNOSIS — M35.00 SICCA SYNDROME (HCC): ICD-10-CM

## 2019-10-21 DIAGNOSIS — M35.00 SJOGREN'S SYNDROME, WITH UNSPECIFIED ORGAN INVOLVEMENT (HCC): ICD-10-CM

## 2019-10-21 PROCEDURE — 85025 COMPLETE CBC W/AUTO DIFF WBC: CPT

## 2019-10-21 PROCEDURE — 86334 IMMUNOFIX E-PHORESIS SERUM: CPT

## 2019-10-21 PROCEDURE — 36415 COLL VENOUS BLD VENIPUNCTURE: CPT

## 2019-10-21 PROCEDURE — 84165 PROTEIN E-PHORESIS SERUM: CPT

## 2019-10-21 PROCEDURE — 86160 COMPLEMENT ANTIGEN: CPT

## 2019-10-21 NOTE — PATIENT INSTRUCTIONS
Please fill out your [address] you will receive after your visit in the mail or via [phone] SJOGREN'S SYNDROME Sjogren's Syndrome is an autoimmune disease that generally affects the salivary and lacrimal glands. Manifestations generally include dry mouth (xerostomia) and dry eye (xerophthalmia), also known as keratoconjunctivitis sicca, that is not secondary to medications (psychiatric, anti-hypertensive), metabolic disorders (hypothyroidism, diabetes), contact lens wearing, and/or decreased blinking (computer work, reading). It may also involve extraglandular organs. There is up to a 10% risk for development of lymphoma, which is up to 44 times higher than the normal population. The greatest risk factor for lymphoma include persistently enlarged salivary glands. Other risk factors include cutaneous vasculitis, lymphadenopathy, splenomegaly, cryoglobulinemia, and the development of glomerulonephritis. Laboratory abnormalities associated with the increased risk for lymphoma include mixed monoclonal cryoglobulinemia, low serum complement C3 or C4, an IgM kappa monoclonal protein, and neutropenia. Therefore, the need for monitoring these labs serially is necessary. My Recommendations I recommend at least annual ophthalmic 
 - ask about punctate plugs for your eyes 
 - ask about Restasis or Xiidris eye drops 
 - use Refresh Plus as needed for dry eyes I recommend at least annual dental evaluations.  
 - Brushing teeth at least twice per day - Avoid sugary lozenges, beverages (Coke/Pepsi) candies and/or gums and to avoid using alcohol based mouth wash - Sugar-free products are preferabe - Biotin oral products are preferable

## 2019-10-21 NOTE — PROGRESS NOTES
REASON FOR VISIT    This is the initial evaluation for Mr. Holman a 69 y.o.  male for question of paresthesias in feet. The patient is referred to the [facility] at the request of Dr. Dorina Dale. HISTORY OF PRESENT ILLNESS     Previous medical records reviewed and summarized: yes    MHAQ: 0  Pain scale: 0    This is a 69 y.o. male with hx of bipolar disorder, cali's esophagus with + SHANE, SSB, possible MGUS. The patient notes that he has a weird feeling at the soles of b/l feet. It feels like numbness but it is not numb. He feels it if he hurts it but its not normal sensation. He has had it for 6 months. It is only along the soles of his feet. Once in a while, it can crawl up over the outside of his feet. Nothing happened when it started such as trauma. It waxes and wanes a little bit. Sometimes the pads of his toes do not feel it. He has not tried anything for it so far. EMG showed some nerve damage. NO weakness, tingling sensation. + dry eyes and dry  Mouth. He has had these for about 6 months as well. He notes his eyes feels itchy all the time and the inside feels scratchy. He uses eye drops and that helps. He uses them twice a day. It seems to work for him. His mouth feels like it has a coating on it. NO difficulty swallowing. Dry foods are a little harder to swallow. He notes his teeth are a mess. No unintentional weight loss, fever, chills  No joint pains, no rashes. REVIEW OF SYSTEMS    A 15 point review of systems was performed and summarized below. The questionnaire was reviewed with the patient and scanned into the patient's medical record.     General: denies recent weight gain, recent weight loss, fatigue, weakness, fever, drenching night sweats  Musculoskeletal: denies joint pain, joint swelling, morning stiffness (lasting 0 minutes), muscle pain  Ears:  denies ringing in ears, hearing loss, deafness  Eyes: endorses dryness,denies pain, light sensitive, redness, blindness, double vision, blurred vision, excess tearing, foreign body sensation  Mouth: endorses dryness, denies sore tongue, oral ulcers, loss of taste, increased dental caries  Nose: denies nosebleeds, nasal ulcers  Throat:  denies food stuck when swallowing, difficulty with swallowing, hoarseness, pain in jaw while chewing  Neck: denies swollen glands, tender glands  Cardiopulmonary: denies pain in chest with deep breaths, pain in chest when lying down, murmurs, sudden changes in heart beat, wheezing, dry cough, productive cough, shortness of breath at rest, shortness of breath on exertion, coughing of blood  Gastrointestinal: denies nausea, heartburn, stomach pain relieved by food, chronic constipation, chronic diarrhea, blood in stools, black stools  Genitourinary: denies vaginal dryness, pain or burning on urination, blood in urine, cloudy urine, vaginal ulcers, penile ulcers  Hematologic: denies anemia, bleeding tendency, blood clots, bleeding gums  Skin: endorses easy bruising,denies  hair loss, rash, rash worsened after sun exposure, hives/urticaria, skin thickening, skin tightness, nodules/bumps, color changes of hands or feet in the cold (Raynaud's)  Neurologic: endorses numbness or tingling in feet, denies numbness or tingling in hands,muscle weakness  Psychiatric: denies depression, excessive worries, PTSD, Bipolar  Sleep: endorses difficulty falling asleep,denies poor sleep (4-9 hours), denies snoring, apnea, daytime somnolence,  difficulty staying asleep     PAST MEDICAL HISTORY    Past Medical History:   Diagnosis Date    [facility] 11/2016    Hughes's esophagus     Hughes's esophagus with esophagitis     B[facility]     CAD (coronary artery disease)     minor calcification in heart    Chronic obstructive pulmonary disease (HCC)     mild    Diverticulitis     Essential tremor     Prostate cancer ([facility] 9/2004    prostatectomy    [facility]     Restless leg syndrome     Sensory Polyneuropathy         Past Surgical History:   Procedure Laterality Date    HX HEENT      deviated septum repair    HX PROSTATECTOMY  2004    HX TONSILLECTOMY         FAMILY HISTORY    Family History   Problem Relation Age of Onset    Diabetes Father     Gout Father     Cancer Maternal Grandmother         stomach cancer    Heart Disease Maternal Grandmother     Stroke Maternal Grandmother     Cancer Other     Heart Disease Other        SOCIAL HISTORY    Social History     Tobacco Use    Smoking status: Former Smoker     Packs/day: 1.00     Years: 50.00     Pack years: 50.00     Last attempt to quit: 2014     Years since quittin.0    Smokeless tobacco: Never Used   Substance Use Topics    Alcohol use: No    Drug use: Yes     Types: Marijuana     Comment: 1 every evening       MEDICATIONS    Current Outpatient Medications   Medication Sig Dispense Refill    sertraline (ZOLOFT) 50 mg tablet Take 1 Tab by mouth daily. 90 Tab 3    lithium carbonate 300 mg tablet Take 1 Tab by mouth daily. 90 Tab 3    omeprazole (PRILOSEC) 40 mg capsule Take 40 mg by mouth Daily (before breakfast).  COCONUT OIL PO Take  by mouth. 7.5 mL by mouth each morning, 7.5 mL at midday, 5 mL at dinner and 5 mL at bedtime      multivitamin (ONE A DAY) tablet Take 1 Tab by mouth every evening.  Aspirin, Buffered 81 mg tab Take 81 mg by mouth daily.  varicella-zoster recombinant, PF, (SHINGRIX, PF,) 50 mcg/0.5 mL susr injection 0.5mL by IntraMUSCular route once now and then repeat in 2-6 months 0.5 mL 1    primidone (MYSOLINE) 50 mg tablet TAKE 1 TAB BY MOUTH DAILY(WITH DINNER). 60 Tab 5    clotrimazole-betamethasone (LOTRISONE) topical cream Apply  to affected area two (2) times a day.          ALLERGIES    Allergies   Allergen Reactions    Sulfa (Sulfonamide Antibiotics) Nausea and Vomiting     Oral only       PHYSICAL EXAMINATION    Visit Vitals  BP 133/74 (BP 1 Location: Right arm, BP Patient Position: Sitting)   Pulse 63   Temp 98.7 °F (37.1 °C) (Oral)   Resp 18   Ht 5' 7\" (1.702 m)   Wt 154 lb 12.8 oz (70.2 kg)   SpO2 96%   BMI 24.25 kg/m²     Body mass index is 24.25 kg/m². General: NAD  HEENT: PERRL, anicteric, non-injected sclerae; oropharynx without ulcers, erythema, or exudate.  + dry eyes and mild dry mouth  Lymphatic: No cervical or axillary lymphadenopathy. Cardiovascular: S1, S2,no R/M/G  Pulmonary: CTA b/l. No wheezes/rales/rhonchi. Abdominal: Soft,NTND, + BS. Skin: No rash, nodules, or periungual changes. Neuro: Alert; able to carry normal conversation  B/l plantar aspect of feet with decreased sensation. Otherwise normal sensation everywhere  Normal proprioception  Normal strength  Musculoskeletal:   Left hand with dupuytrens contracture  Right hand with a scar from prior surgery for dupuytrens. DATA REVIEW    Prior medical records were reviewed and if applicable are summarized as below:    Labs:   10/2019: vitamin B12, folate, vitamin B6, TSH normal, SPEP with M spike, ESR normal, SHANE direct, SSB positive    Imaging:   EMG (10/2019): 1) Absent sensory responses in both lower extremities  2) Prolonged right Tibial F-wave response  3) Absent H-reflex bilaterally  These findings are consistent with a sensory polyneuropathy. ASSESSMENT AND PLAN    A 69 y.o. male with hx of bipolar disorder presents for evaluation of b/l LE sensory polyneuropathy along with positive SHANE and SSB. The patient likely has sjogrens syndrome and his neuropathy is likely a result of sjogrens syndrome. Sjogrens syndrome is treated symptomatically. # Sensory polyneuropathy: likely secondary to sjogrens  - patient's symptoms are mild and he does not think he needs treatment at this time. - can consider neurontin in the future    # Sjogrens syndrome:  - instructions on dry eyes and dry mouth care provided to the patient.    - will check C3, C4,     # Possible MGUS:  - will repeat SPEP with ИВАН  - if abnormal,  Will send patient to hematology    # Bipolar d/o:  - on lithium    RTC in 1 year    The patient voiced understanding of the aforementioned assessment and plan. Summary of plan was provided in the After Visit Summary patient instructions. I also provided education about MyChart setup and utility. Mr. Bisi Mckee has a reminder for a \"due or due soon\" health maintenance. I have asked that he contact his primary care provider for follow-up on this health maintenance. TODAY'S ORDERS    Orders Placed This Encounter    CBC WITH AUTOMATED DIFF    COMPLEMENT, C3 & C4    PROTEIN ELECTROPHORESIS W/ REFLX ИВАН       No future appointments.     Serena Bojorquez MD    Adult Rheumatology   [facility]  A Part of [facility], [address] [place] [address]   Phone [phone]  Fax [phone]

## 2019-10-25 LAB
ALBUMIN SERPL ELPH-MCNC: 3.8 G/DL (ref 2.9–4.4)
ALBUMIN/GLOB SERPL: 1.4 {RATIO} (ref 0.7–1.7)
ALPHA1 GLOB SERPL ELPH-MCNC: 0.2 G/DL (ref 0–0.4)
ALPHA2 GLOB SERPL ELPH-MCNC: 0.7 G/DL (ref 0.4–1)
B-GLOBULIN SERPL ELPH-MCNC: 1 G/DL (ref 0.7–1.3)
BASOPHILS # BLD AUTO: 0.1 X10E3/UL (ref 0–0.2)
BASOPHILS NFR BLD AUTO: 1 %
C3 SERPL-MCNC: 119 MG/DL (ref 82–167)
C4 SERPL-MCNC: 18 MG/DL (ref 14–44)
EOSINOPHIL # BLD AUTO: 0.2 X10E3/UL (ref 0–0.4)
EOSINOPHIL NFR BLD AUTO: 4 %
ERYTHROCYTE [DISTWIDTH] IN BLOOD BY AUTOMATED COUNT: 12.5 % (ref 12.3–15.4)
GAMMA GLOB SERPL ELPH-MCNC: 0.8 G/DL (ref 0.4–1.8)
GLOBULIN SER CALC-MCNC: 2.7 G/DL (ref 2.2–3.9)
HCT VFR BLD AUTO: 37.5 % (ref 37.5–51)
HGB BLD-MCNC: 12.7 G/DL (ref 13–17.7)
IGA SERPL-MCNC: 114 MG/DL (ref 61–437)
IGG SERPL-MCNC: 865 MG/DL (ref 700–1600)
IGM SERPL-MCNC: 53 MG/DL (ref 15–143)
IMM GRANULOCYTES # BLD AUTO: 0 X10E3/UL (ref 0–0.1)
IMM GRANULOCYTES NFR BLD AUTO: 0 %
INTERPRETATION SERPL IEP-IMP: NORMAL
LYMPHOCYTES # BLD AUTO: 1.5 X10E3/UL (ref 0.7–3.1)
LYMPHOCYTES NFR BLD AUTO: 22 %
M PROTEIN SERPL ELPH-MCNC: 0.5 G/DL
MCH RBC QN AUTO: 34.4 PG (ref 26.6–33)
MCHC RBC AUTO-ENTMCNC: 33.9 G/DL (ref 31.5–35.7)
MCV RBC AUTO: 102 FL (ref 79–97)
MONOCYTES # BLD AUTO: 0.5 X10E3/UL (ref 0.1–0.9)
MONOCYTES NFR BLD AUTO: 8 %
NEUTROPHILS # BLD AUTO: 4.5 X10E3/UL (ref 1.4–7)
NEUTROPHILS NFR BLD AUTO: 65 %
PLATELET # BLD AUTO: 197 X10E3/UL (ref 150–450)
PLEASE NOTE, 011150: ABNORMAL
PROT PATTERN SERPL ELPH-IMP: ABNORMAL
PROT SERPL-MCNC: 6.5 G/DL (ref 6–8.5)
RBC # BLD AUTO: 3.69 X10E6/UL (ref 4.14–5.8)
WBC # BLD AUTO: 6.9 X10E3/UL (ref 3.4–10.8)

## 2019-10-29 ENCOUNTER — TELEPHONE (OUTPATIENT)
Dept: FAMILY MEDICINE CLINIC | Age: 74
End: 2019-10-29

## 2019-10-29 NOTE — TELEPHONE ENCOUNTER
Joan 67    Dx-wrist,  Referral order of Dr. Nadya Justice. appt surgery OCT 31, Dr. Jonathan Holman  AO--[phone]  --[phone]    Need recent labs and EKG.

## 2019-10-31 DIAGNOSIS — M35.00 SJOGREN'S SYNDROME, WITH UNSPECIFIED ORGAN INVOLVEMENT (HCC): Primary | ICD-10-CM

## 2019-10-31 DIAGNOSIS — R77.8 ABNORMAL SPEP: ICD-10-CM

## 2019-11-14 ENCOUNTER — OFFICE VISIT (OUTPATIENT)
Dept: ONCOLOGY | Age: 74
End: 2019-11-14

## 2019-11-14 VITALS
TEMPERATURE: 96.2 F | DIASTOLIC BLOOD PRESSURE: 72 MMHG | HEIGHT: 67 IN | RESPIRATION RATE: 16 BRPM | HEART RATE: 65 BPM | WEIGHT: 159.2 LBS | BODY MASS INDEX: 24.99 KG/M2 | OXYGEN SATURATION: 100 % | SYSTOLIC BLOOD PRESSURE: 127 MMHG

## 2019-11-14 DIAGNOSIS — R77.9 ELEVATED SERUM PROTEIN LEVEL: ICD-10-CM

## 2019-11-14 DIAGNOSIS — D50.9 IRON DEFICIENCY ANEMIA, UNSPECIFIED IRON DEFICIENCY ANEMIA TYPE: ICD-10-CM

## 2019-11-14 DIAGNOSIS — D47.2 MGUS (MONOCLONAL GAMMOPATHY OF UNKNOWN SIGNIFICANCE): Primary | ICD-10-CM

## 2019-11-14 NOTE — PROGRESS NOTES
70 y/o cauc male here for a new pt. Appointment for abnormal labs. 10/8/19 mspike 0.5 f-33 initiated oral intake. Hb 12.5 10/21 hb 12.7. Pt has a dx of Srogens syndrome dx'd a month or so ago. LC FROM DR Seretha Lesch CMP AND FREE LIGHT CHAIN ASSAY.

## 2019-12-09 LAB
BUN SERPL-MCNC: 17 MG/DL (ref 8–27)
BUN/CREAT SERPL: 14 (ref 10–24)
CALCIUM SERPL-MCNC: 9.6 MG/DL (ref 8.6–10.2)
CHLORIDE SERPL-SCNC: 109 MMOL/L (ref 96–106)
CO2 SERPL-SCNC: 22 MMOL/L (ref 20–29)
CREAT SERPL-MCNC: 1.25 MG/DL (ref 0.76–1.27)
GLUCOSE SERPL-MCNC: 120 MG/DL (ref 65–99)
KAPPA LC FREE SER-MCNC: 24.2 MG/L (ref 3.3–19.4)
KAPPA LC FREE/LAMBDA FREE SER: 0.52 {RATIO} (ref 0.26–1.65)
LAMBDA LC FREE SERPL-MCNC: 46.2 MG/L (ref 5.7–26.3)
POTASSIUM SERPL-SCNC: 5.1 MMOL/L (ref 3.5–5.2)
SODIUM SERPL-SCNC: 144 MMOL/L (ref 134–144)

## 2019-12-18 NOTE — PROGRESS NOTES
[address] [facility]  at [address] [place] [address], [address] [facility]  [phone]        Patient: Clarke West MRN: [id number]  SSN: [id number]    YOB: 1945  Age: 69 y.o. Sex: male        Subjective:      Clarke West is a 69 y.o. male who I am seeing in consultation for monoclonal gammopathy. He has a history of. Sjogren's syndrome. He was seen recently by Dr. Irvin Bustos. Routine laboratory studies show presence of small M-spike. He has paraesthesias secondary to Sjogren's syndrome. He denies bone pains or weight loss.        Review of Systems:    Constitutional: negative  Eyes: negative  Ears, Nose, Mouth, Throat, and Face: negative  Respiratory: negative  Cardiovascular: negative  Gastrointestinal: negative  Genitourinary:negative  Integument/Breast: negative  Hematologic/Lymphatic: negative  Musculoskeletal:negative  Neurological: decreased sensation in both feet      Past Medical History:   Diagnosis Date    Alzheimer disease ([address] 11/2016    Hughes's esophagus     Hughes's esophagus with esophagitis     Bipolar disorder ([address]     CAD (coronary artery disease)     minor calcification in heart    Chronic obstructive pulmonary disease (HCC)     mild    Diverticulitis     Essential tremor     Prostate cancer ([address] 9/2004    prostatectomy    Psychotic disorder ([address]     Restless leg syndrome     Sensory Polyneuropathy      Past Surgical History:   Procedure Laterality Date    HX HEENT      deviated septum repair    HX PROSTATECTOMY  2004    HX TONSILLECTOMY        Family History   Problem Relation Age of Onset    Diabetes Father     Gout Father     Cancer Maternal Grandmother         stomach cancer    Heart Disease Maternal Grandmother     Stroke Maternal Grandmother     Cancer Other     Heart Disease Other      Social History     Tobacco Use    Smoking status: Former Smoker     Packs/day: 1.00 Years: 50.00     Pack years: 50.00     Last attempt to quit: 2014     Years since quittin.2    Smokeless tobacco: Never Used   Substance Use Topics    Alcohol use: No      Prior to Admission medications    Medication Sig Start Date End Date Taking? Authorizing Provider   milk thistle/NAC/dandel/turmer (LIVER COMPLEX PO) Take 500 mg by mouth two (2) times a day. Yes Provider, Historical   sertraline (ZOLOFT) 50 mg tablet Take 1 Tab by mouth daily. 18  Yes Ruth Ann Su MD   lithium carbonate 300 mg tablet Take 1 Tab by mouth daily. 18  Yes Ruth Ann Su MD   clotrimazole-betamethasone (LOTRISONE) topical cream Apply  to affected area two (2) times a day. Yes Provider, Historical   omeprazole (PRILOSEC) 40 mg capsule Take 40 mg by mouth Daily (before breakfast). Yes Provider, Historical   COCONUT OIL PO Take  by mouth. 7.5 mL by mouth each morning, 7.5 mL at midday, 5 mL at dinner and 5 mL at bedtime   Yes Provider, Historical   multivitamin (ONE A DAY) tablet Take 1 Tab by mouth every evening. Yes Provider, Historical   Aspirin, Buffered 81 mg tab Take 81 mg by mouth daily. Yes Provider, Historical   varicella-zoster recombinant, PF, (SHINGRIX, PF,) 50 mcg/0.5 mL susr injection 0.5mL by IntraMUSCular route once now and then repeat in 2-6 months 10/8/19   Jozef Haynes MD   primidone (MYSOLINE) 50 mg tablet TAKE 1 TAB BY MOUTH DAILY(WITH DINNER). 6/10/18   Andrew Massey MD              Allergies   Allergen Reactions    Sulfa (Sulfonamide Antibiotics) Nausea and Vomiting     Oral only           Objective:     Vitals:    19 1104   BP: 127/72   Pulse: 65   Resp: 16   Temp: 96.2 °F (35.7 °C)   TempSrc: Oral   SpO2: 100%   Weight: 159 lb 3.2 oz (72.2 kg)   Height: 5' 7\" (1.702 m)            Physical Exam:    GENERAL: alert, cooperative, no distress, appears stated age  EYE: conjunctivae/corneas clear.  PERRL, EOM's intact  LYMPHATIC: Cervical, supraclavicular, and axillary nodes normal.   THROAT & NECK: normal and no erythema or exudates noted. LUNG: clear to auscultation bilaterally  HEART: regular rate and rhythm, S1, S2 normal, no murmur, click, rub or gallop  ABDOMEN: soft, non-tender. Bowel sounds normal. No masses,  no organomegaly  EXTREMITIES:  extremities normal, atraumatic, no cyanosis or edema  SKIN: Normal.  NEUROLOGIC: AOx3. Gait normal. Reflexes and motor strength normal and symmetric. Cranial nerves 2-12. Decreased sensation in both feet. Lab Results   Component Value Date/Time    WBC 6.9 10/21/2019 03:44 PM    Hemoglobin (POC) 13.9 03/19/2016 09:13 PM    HGB 12.7 (L) 10/21/2019 03:44 PM    Hematocrit (POC) 41 03/19/2016 09:13 PM    HCT 37.5 10/21/2019 03:44 PM    PLATELET 046 17/79/7300 03:44 PM     (H) 10/21/2019 03:44 PM       Lab Results   Component Value Date/Time    Sodium 144 12/06/2019 02:58 PM    Potassium 5.1 12/06/2019 02:58 PM    Chloride 109 (H) 12/06/2019 02:58 PM    CO2 22 12/06/2019 02:58 PM    Anion gap 4 (L) 05/23/2016 04:17 AM    Glucose 120 (H) 12/06/2019 02:58 PM    Glucose 79 10/08/2019 01:38 PM    BUN 17 12/06/2019 02:58 PM    Creatinine 1.25 12/06/2019 02:58 PM    BUN/Creatinine ratio 14 12/06/2019 02:58 PM    GFR est AA 66 12/06/2019 02:58 PM    GFR est non-AA 57 (L) 12/06/2019 02:58 PM    Calcium 9.6 12/06/2019 02:58 PM    Bilirubin, total 0.6 08/07/2018 11:32 AM    AST (SGOT) 15 08/07/2018 11:32 AM    Alk. phosphatase 64 08/07/2018 11:32 AM    Protein, total 6.5 10/21/2019 03:44 PM    Albumin 4.3 08/07/2018 11:32 AM    Globulin 2.6 05/13/2016 02:20 PM    A-G Ratio 1.9 08/07/2018 11:32 AM    ALT (SGPT) 14 08/07/2018 11:32 AM                Assessment:     1. MGUS   IgG lambda   M-spike: 0.5   normal kappa/lambda ratio (free light chain assay)   No end organ damage    > Observation  > Risk of progression to Myeloma is low     I reassured the patient. Plan:       1. Return as needed.        Signed by: Daniel Elliott, MD                     December 17, 2019          CC. Lear Boxer, MD  CC.  Kiana Marcial MD

## 2020-12-03 ENCOUNTER — OFFICE VISIT (OUTPATIENT)
Dept: FAMILY MEDICINE CLINIC | Age: 75
End: 2020-12-03
Payer: MEDICARE

## 2020-12-03 VITALS
RESPIRATION RATE: 20 BRPM | BODY MASS INDEX: 24.64 KG/M2 | TEMPERATURE: 98 F | WEIGHT: 157 LBS | DIASTOLIC BLOOD PRESSURE: 52 MMHG | HEIGHT: 67 IN | OXYGEN SATURATION: 97 % | HEART RATE: 68 BPM | SYSTOLIC BLOOD PRESSURE: 137 MMHG

## 2020-12-03 DIAGNOSIS — R42 DIZZINESS: Primary | ICD-10-CM

## 2020-12-03 DIAGNOSIS — F31.31 BIPOLAR AFFECTIVE DISORDER, CURRENTLY DEPRESSED, MILD (HCC): ICD-10-CM

## 2020-12-03 PROCEDURE — G8420 CALC BMI NORM PARAMETERS: HCPCS | Performed by: STUDENT IN AN ORGANIZED HEALTH CARE EDUCATION/TRAINING PROGRAM

## 2020-12-03 PROCEDURE — 1101F PT FALLS ASSESS-DOCD LE1/YR: CPT | Performed by: STUDENT IN AN ORGANIZED HEALTH CARE EDUCATION/TRAINING PROGRAM

## 2020-12-03 PROCEDURE — 3017F COLORECTAL CA SCREEN DOC REV: CPT | Performed by: STUDENT IN AN ORGANIZED HEALTH CARE EDUCATION/TRAINING PROGRAM

## 2020-12-03 PROCEDURE — G8536 NO DOC ELDER MAL SCRN: HCPCS | Performed by: STUDENT IN AN ORGANIZED HEALTH CARE EDUCATION/TRAINING PROGRAM

## 2020-12-03 PROCEDURE — G9717 DOC PT DX DEP/BP F/U NT REQ: HCPCS | Performed by: STUDENT IN AN ORGANIZED HEALTH CARE EDUCATION/TRAINING PROGRAM

## 2020-12-03 PROCEDURE — G8427 DOCREV CUR MEDS BY ELIG CLIN: HCPCS | Performed by: STUDENT IN AN ORGANIZED HEALTH CARE EDUCATION/TRAINING PROGRAM

## 2020-12-03 PROCEDURE — G0463 HOSPITAL OUTPT CLINIC VISIT: HCPCS | Performed by: STUDENT IN AN ORGANIZED HEALTH CARE EDUCATION/TRAINING PROGRAM

## 2020-12-03 PROCEDURE — 99213 OFFICE O/P EST LOW 20 MIN: CPT | Performed by: STUDENT IN AN ORGANIZED HEALTH CARE EDUCATION/TRAINING PROGRAM

## 2020-12-03 RX ORDER — MECLIZINE HYDROCHLORIDE 25 MG/1
25 TABLET ORAL
Qty: 90 TAB | Refills: 0 | Status: SHIPPED | OUTPATIENT
Start: 2020-12-03 | End: 2021-01-02

## 2020-12-03 NOTE — PATIENT INSTRUCTIONS
Dizziness: Care Instructions  Your Care Instructions  Dizziness is the feeling of unsteadiness or fuzziness in your head. It is different than having vertigo, which is a feeling that the room is spinning or that you are moving or falling. It is also different from lightheadedness, which is the feeling that you are about to faint. It can be hard to know what causes dizziness. Some people feel dizzy when they have migraine headaches. Sometimes bouts of flu can make you feel dizzy. Some medical conditions, such as heart problems or high blood pressure, can make you feel dizzy. Many medicines can cause dizziness, including medicines for high blood pressure, pain, or anxiety. If a medicine causes your symptoms, your doctor may recommend that you stop or change the medicine. If it is a problem with your heart, you may need medicine to help your heart work better. If there is no clear reason for your symptoms, your doctor may suggest watching and waiting for a while to see if the dizziness goes away on its own. Follow-up care is a key part of your treatment and safety. Be sure to make and go to all appointments, and call your doctor if you are having problems. It's also a good idea to know your test results and keep a list of the medicines you take. How can you care for yourself at home? · If your doctor recommends or prescribes medicine, take it exactly as directed. Call your doctor if you think you are having a problem with your medicine. · Do not drive while you feel dizzy. · Try to prevent falls. Steps you can take include:  ? Using nonskid mats, adding grab bars near the tub, and using night-lights. ? Clearing your home so that walkways are free of anything you might trip on.  ? Letting family and friends know that you have been feeling dizzy. This will help them know how to help you. When should you call for help? Call 911 anytime you think you may need emergency care.  For example, call if:    · You passed out (lost consciousness).     · You have dizziness along with symptoms of a heart attack. These may include:  ? Chest pain or pressure, or a strange feeling in the chest.  ? Sweating. ? Shortness of breath. ? Nausea or vomiting. ? Pain, pressure, or a strange feeling in the back, neck, jaw, or upper belly or in one or both shoulders or arms. ? Lightheadedness or sudden weakness. ? A fast or irregular heartbeat.     · You have symptoms of a stroke. These may include:  ? Sudden numbness, tingling, weakness, or loss of movement in your face, arm, or leg, especially on only one side of your body. ? Sudden vision changes. ? Sudden trouble speaking. ? Sudden confusion or trouble understanding simple statements. ? Sudden problems with walking or balance. ? A sudden, severe headache that is different from past headaches. Call your doctor now or seek immediate medical care if:    · You feel dizzy and have a fever, headache, or ringing in your ears.     · You have new or increased nausea and vomiting.     · Your dizziness does not go away or comes back. Watch closely for changes in your health, and be sure to contact your doctor if:    · You do not get better as expected. Where can you learn more? Go to http://www.gray.com/  Enter Q823 in the search box to learn more about \"Dizziness: Care Instructions. \"  Current as of: June 26, 2019               Content Version: 12.6  © 3118-6073 [employer]. Care instructions adapted under license by [employer] (which disclaims liability or warranty for this information). If you have questions about a medical condition or this instruction, always ask your healthcare professional. [place] [address] any warranty or liability for your use of this information.

## 2020-12-03 NOTE — PROGRESS NOTES
Chief Complaint: Dizziness    Subjective  Buena Harada is an 77 y.o. male who presents for dizziness. Pt reports a h/o of ~ 6 days of being experiencing dizziness while getting up from lying to upright position. He feels \"like he is spinning around the room\" accompanied of a sense of unsteadiness, theses episodes last for about 2-3 minutes. Also he has been having occasional mild headaches for the past couple of days. He denies palpitation, nausea, vomiting, vision problems, no CP, no tinnitus, no syncope, denies episode of dizziness after rolling in bed, no urinary symptoms. Allergies - reviewed: Allergies   Allergen Reactions    Sulfa (Sulfonamide Antibiotics) Nausea and Vomiting     Oral only       Medications - reviewed:   Current Outpatient Medications   Medication Sig    milk thistle/NAC/dandel/turmer (LIVER COMPLEX PO) Take 500 mg by mouth two (2) times a day.  varicella-zoster recombinant, PF, (SHINGRIX, PF,) 50 mcg/0.5 mL susr injection 0.5mL by IntraMUSCular route once now and then repeat in 2-6 months    sertraline (ZOLOFT) 50 mg tablet Take 1 Tab by mouth daily.  lithium carbonate 300 mg tablet Take 1 Tab by mouth daily.  primidone (MYSOLINE) 50 mg tablet TAKE 1 TAB BY MOUTH DAILY(WITH DINNER).  clotrimazole-betamethasone (LOTRISONE) topical cream Apply  to affected area two (2) times a day.  omeprazole (PRILOSEC) 40 mg capsule Take 40 mg by mouth Daily (before breakfast).  COCONUT OIL PO Take  by mouth. 7.5 mL by mouth each morning, 7.5 mL at midday, 5 mL at dinner and 5 mL at bedtime    multivitamin (ONE A DAY) tablet Take 1 Tab by mouth every evening.  Aspirin, Buffered 81 mg tab Take 81 mg by mouth daily. No current facility-administered medications for this visit.       Past Medical History - reviewed:  Past Medical History:   Diagnosis Date    Alzheimer disease ([facility] 11/2016    Hughes's esophagus     Hughes's esophagus with esophagitis     Bipolar disorder ([facility]     CAD (coronary artery disease)     minor calcification in heart    Chronic obstructive pulmonary disease (HCC)     mild    Diverticulitis     Essential tremor     Prostate cancer ([facility] 2004    prostatectomy    Psychotic disorder (HCC)     Restless leg syndrome     Sensory Polyneuropathy      Past Surgical History - reviewed:   Past Surgical History:   Procedure Laterality Date    HX HEENT      deviated septum repair    HX PROSTATECTOMY      HX TONSILLECTOMY       Social History - reviewed:  Social History     Socioeconomic History    Marital status:      Spouse name: Not on file    Number of children: Not on file    Years of education: Not on file    Highest education level: Not on file   Occupational History    Not on file   Social Needs    Financial resource strain: Not on file    Food insecurity     Worry: Not on file     Inability: Not on file    Transportation needs     Medical: Not on file     Non-medical: Not on file   Tobacco Use    Smoking status: Former Smoker     Packs/day: 1.00     Years: 50.00     Pack years: 50.00     Last attempt to quit: 2014     Years since quittin.2    Smokeless tobacco: Never Used   Substance and Sexual Activity    Alcohol use: No    Drug use: Yes     Types: Marijuana     Comment: 1 every evening    Sexual activity: Not Currently   Lifestyle    Physical activity     Days per week: Not on file     Minutes per session: Not on file    Stress: Not on file   Relationships    Social connections     Talks on phone: Not on file     Gets together: Not on file     Attends Cheondoism service: Not on file     Active member of club or organization: Not on file     Attends meetings of clubs or organizations: Not on file     Relationship status: Not on file    Intimate partner violence     Fear of current or ex partner: Not on file     Emotionally abused: Not on file     Physically abused: Not on file     Forced sexual activity: Not on file   Other Topics Concern    Not on file   Social History Narrative    Not on file       Family History - reviewed:  Family History   Problem Relation Age of Onset    Diabetes Father     Gout Father     Cancer Maternal Grandmother         stomach cancer    Heart Disease Maternal Grandmother     Stroke Maternal Grandmother     Cancer Other     Heart Disease Other      Immunizations - reviewed:   Immunization History   Administered Date(s) Administered    Influenza High Dose Vaccine PF 2016, 2017    Influenza Vaccine (Tri) Adjuvanted (>65 Yrs FLUAD TRI 80020) 2018, 10/08/2019    Pneumococcal Conjugate (PCV-13) 2015    Pneumococcal Polysaccharide (PPSV-23) 2014    Tdap 2014    Varicella Virus Vaccine 2013     Flu: He got it 2 weeks ago. ROS  CONSTITUTIONAL: Denies: Denies: fever, chills, weakness, fatigue  EYES: Denies: blurry vision, decreased vision, loss of vision, diplopia  ENT: Denies: tinnitus  CARDIOVASCULAR: Denies: chest pain, dyspnea on exertion, palpitations  RESPIRATORY: Denies: shortness of breath  GI: Denies nausea, vomiting  : Denies: dysuria, frequency/urgency. NEURO: dizziness, Denies: focal weakness, numbness/tingling, speech problems  MUSCULOSKELETAL: Denies muscle weakness    Physical Exam    Vitals:    20 1350   BP: (!) 137/52   BP 1 Location: Right arm   BP Patient Position: Sitting   Pulse: 68   Resp: 20   Temp: 98 °F (36.7 °C)   TempSrc: Temporal   SpO2: 97%   Weight: 157 lb (71.2 kg)   Height: 5' 7\" (1.702 m)     Orthostatic BP  Supine: 125/83  Sittin/66  Standin/67    General: No acute distress. Alert. Cooperative. Head: Normocephalic. Atraumatic. Throat: Mucosa pink. Moist mucous membranes. Respiratory: CTAB. No w/r/r/c.  Cardiovascular: RRR. Normal S1,S2. No m/r/g.   GI: Nondistended.+ bowel sounds. Nontender. No rebound tenderness or guarding. Extremities: LE edema. Distal pulses present.   Neuro: Alert and oriented. Cranial Nerves grossly intact. Normal Equilibrium. During the examination, while patient moved from lying to standing position, he experienced a short episode of dizziness. Assessment/Plan  Mega Daily 76 yo with a PMHx of Bipolar Affective type, Sjorgren's Sd, Hughes Esophagus, here d/t dizziness. Vertigo: 6 days of dizziness and mild HA, BPs reading concerning for Orthostatic hypotension. Other causes may include vasovagal vs mediation SE.   - Meclizine 25 mg q8h as needed. - CBC, CMP, Magnesium level  - Follow up in 3 weeks or sooner if needed. Bipolar Disorder, Affective type: Pt on Lithium 300 mg daily. Last Lithium level in 2019.   - Will check Lithium levels. I have discussed the diagnosis with the patient and the intended plan as seen in the above orders. Patient verbalized understanding of the plan and agrees with the plan. The patient has received an after-visit summary and questions were answered concerning future plans. I have discussed medication side effects and warnings with the patient as well. Informed patient to return to the office if new symptoms arise. Pt discussed with Dr. Martir Rodriguez (Attending physician).     Winnie Arroyo MD  Family Medicine Resident

## 2020-12-04 ENCOUNTER — TELEPHONE (OUTPATIENT)
Dept: FAMILY MEDICINE CLINIC | Age: 75
End: 2020-12-04

## 2020-12-04 LAB
ALBUMIN SERPL-MCNC: 3.9 G/DL (ref 3.5–5)
ALBUMIN/GLOB SERPL: 1.3 {RATIO} (ref 1.1–2.2)
ALP SERPL-CCNC: 73 U/L (ref 45–117)
ALT SERPL-CCNC: 20 U/L (ref 12–78)
ANION GAP SERPL CALC-SCNC: 8 MMOL/L (ref 5–15)
AST SERPL-CCNC: 15 U/L (ref 15–37)
BASOPHILS # BLD: 0.1 K/UL (ref 0–0.1)
BASOPHILS NFR BLD: 1 % (ref 0–1)
BILIRUB SERPL-MCNC: 0.4 MG/DL (ref 0.2–1)
BUN SERPL-MCNC: 20 MG/DL (ref 6–20)
BUN/CREAT SERPL: 15 (ref 12–20)
CALCIUM SERPL-MCNC: 9.1 MG/DL (ref 8.5–10.1)
CHLORIDE SERPL-SCNC: 110 MMOL/L (ref 97–108)
CO2 SERPL-SCNC: 25 MMOL/L (ref 21–32)
CREAT SERPL-MCNC: 1.33 MG/DL (ref 0.7–1.3)
DATE LAST DOSE: ABNORMAL
DIFFERENTIAL METHOD BLD: ABNORMAL
EOSINOPHIL # BLD: 0.2 K/UL (ref 0–0.4)
EOSINOPHIL NFR BLD: 3 % (ref 0–7)
ERYTHROCYTE [DISTWIDTH] IN BLOOD BY AUTOMATED COUNT: 12.4 % (ref 11.5–14.5)
GLOBULIN SER CALC-MCNC: 3.1 G/DL (ref 2–4)
GLUCOSE SERPL-MCNC: 107 MG/DL (ref 65–100)
HCT VFR BLD AUTO: 41.1 % (ref 36.6–50.3)
HGB BLD-MCNC: 13 G/DL (ref 12.1–17)
IMM GRANULOCYTES # BLD AUTO: 0 K/UL (ref 0–0.04)
IMM GRANULOCYTES NFR BLD AUTO: 0 % (ref 0–0.5)
LITHIUM SERPL-SCNC: 0.58 MMOL/L (ref 0.6–1.2)
LYMPHOCYTES # BLD: 1.2 K/UL (ref 0.8–3.5)
LYMPHOCYTES NFR BLD: 19 % (ref 12–49)
MAGNESIUM SERPL-MCNC: 2.2 MG/DL (ref 1.6–2.4)
MCH RBC QN AUTO: 34.3 PG (ref 26–34)
MCHC RBC AUTO-ENTMCNC: 31.6 G/DL (ref 30–36.5)
MCV RBC AUTO: 108.4 FL (ref 80–99)
MONOCYTES # BLD: 0.4 K/UL (ref 0–1)
MONOCYTES NFR BLD: 6 % (ref 5–13)
NEUTS SEG # BLD: 4.3 K/UL (ref 1.8–8)
NEUTS SEG NFR BLD: 71 % (ref 32–75)
NRBC # BLD: 0 K/UL (ref 0–0.01)
NRBC BLD-RTO: 0 PER 100 WBC
PLATELET # BLD AUTO: 188 K/UL (ref 150–400)
PLATELET COMMENTS,PCOM: ABNORMAL
PMV BLD AUTO: 10.1 FL (ref 8.9–12.9)
POTASSIUM SERPL-SCNC: 4.9 MMOL/L (ref 3.5–5.1)
PROT SERPL-MCNC: 7 G/DL (ref 6.4–8.2)
RBC # BLD AUTO: 3.79 M/UL (ref 4.1–5.7)
RBC MORPH BLD: ABNORMAL
REPORTED DOSE,DOSE: ABNORMAL UNITS
REPORTED DOSE/TIME,TMG: ABNORMAL
SODIUM SERPL-SCNC: 143 MMOL/L (ref 136–145)
WBC # BLD AUTO: 6.2 K/UL (ref 4.1–11.1)

## 2020-12-04 NOTE — TELEPHONE ENCOUNTER
I called pt to inform about lab results. Mag level wnl  Lithium level 0.58, mildly below the normal range. Pt advised to follow with his Psychiatrist for any medication adjustment. CBC with RBC:3.79 MCV: 108.4 and MCH:34.3, these abnormalities were present on prior CBC, pt with MGUS follows with Heme/Onco as OP. CMP: Cr:1.33 likely d/t dehydration or age. Pt verbalized understanding.

## 2022-02-23 ENCOUNTER — OFFICE VISIT (OUTPATIENT)
Dept: FAMILY MEDICINE CLINIC | Age: 77
End: 2022-02-23
Payer: MEDICARE

## 2022-02-23 VITALS
BODY MASS INDEX: 24.83 KG/M2 | HEIGHT: 67 IN | RESPIRATION RATE: 16 BRPM | OXYGEN SATURATION: 97 % | HEART RATE: 58 BPM | SYSTOLIC BLOOD PRESSURE: 125 MMHG | WEIGHT: 158.2 LBS | DIASTOLIC BLOOD PRESSURE: 63 MMHG

## 2022-02-23 DIAGNOSIS — Z79.899 ENCOUNTER FOR LONG-TERM (CURRENT) USE OF HIGH-RISK MEDICATION: ICD-10-CM

## 2022-02-23 DIAGNOSIS — Z12.5 SPECIAL SCREENING FOR MALIGNANT NEOPLASM OF PROSTATE: ICD-10-CM

## 2022-02-23 DIAGNOSIS — Z87.891 PERSONAL HISTORY OF TOBACCO USE, PRESENTING HAZARDS TO HEALTH: ICD-10-CM

## 2022-02-23 DIAGNOSIS — H81.10 BENIGN PAROXYSMAL POSITIONAL VERTIGO, UNSPECIFIED LATERALITY: ICD-10-CM

## 2022-02-23 DIAGNOSIS — Z23 ENCOUNTER FOR IMMUNIZATION: ICD-10-CM

## 2022-02-23 DIAGNOSIS — Z71.89 ADVANCED DIRECTIVES, COUNSELING/DISCUSSION: ICD-10-CM

## 2022-02-23 DIAGNOSIS — Z00.00 MEDICARE ANNUAL WELLNESS VISIT, SUBSEQUENT: Primary | ICD-10-CM

## 2022-02-23 DIAGNOSIS — Z12.11 COLON CANCER SCREENING: ICD-10-CM

## 2022-02-23 PROCEDURE — G8536 NO DOC ELDER MAL SCRN: HCPCS | Performed by: FAMILY MEDICINE

## 2022-02-23 PROCEDURE — G8427 DOCREV CUR MEDS BY ELIG CLIN: HCPCS | Performed by: FAMILY MEDICINE

## 2022-02-23 PROCEDURE — G9717 DOC PT DX DEP/BP F/U NT REQ: HCPCS | Performed by: FAMILY MEDICINE

## 2022-02-23 PROCEDURE — G8420 CALC BMI NORM PARAMETERS: HCPCS | Performed by: FAMILY MEDICINE

## 2022-02-23 PROCEDURE — 3017F COLORECTAL CA SCREEN DOC REV: CPT | Performed by: FAMILY MEDICINE

## 2022-02-23 PROCEDURE — G0296 VISIT TO DETERM LDCT ELIG: HCPCS | Performed by: FAMILY MEDICINE

## 2022-02-23 PROCEDURE — 1101F PT FALLS ASSESS-DOCD LE1/YR: CPT | Performed by: FAMILY MEDICINE

## 2022-02-23 PROCEDURE — G0439 PPPS, SUBSEQ VISIT: HCPCS | Performed by: FAMILY MEDICINE

## 2022-02-23 NOTE — ACP (ADVANCE CARE PLANNING)
Advance Care Planning     Advance Care Planning (ACP) Physician/NP/PA Conversation      Date of Conversation: 2/23/2022  Conducted with: Patient with Decision Making Capacity    Healthcare Decision Maker:     Primary Decision Maker: Sylvia Holt - Spouse - [phone]  Today we documented Decision Maker(s) consistent with ACP documents on file. Care Preferences:    Hospitalization: \"If your health worsens and it becomes clear that your chance of recovery is unlikely, what would be your preference regarding hospitalization? \"  The patient would prefer comfort-focused treatment without hospitalization. Ventilation: \"If you were unable to breathe on your own and your chance of recovery was unlikely, what would be your preference about the use of a ventilator (breathing machine) if it was available to you? \"   The patient would NOT desire the use of a ventilator. Resuscitation: \"In the event your heart stopped as a result of an underlying serious health condition, would you want attempts to be made to restart your heart, or would you prefer a natural death? \"   Yes, attempt to resuscitate.     Additional topics discussed: treatment goals, benefit/burden of treatment options, resuscitation preferences and end of life care preferences (vegetative state/imminent death)    Conversation Outcomes / Follow-Up Plan:   ACP complete - no further action today  Reviewed DNR/DNI and patient elects Full Code (Attempt Resuscitation)     Length of Voluntary ACP Conversation in minutes:  <16 minutes (Non-Billable)    Ag Rodriguez MD

## 2022-02-23 NOTE — PROGRESS NOTES
This is the Subsequent Medicare Annual Wellness Exam, performed 12 months or more after the Initial AWV or the last Subsequent AWV    I have reviewed the patient's medical history in detail and updated the computerized patient record. Dizziness- Orthostatic in nature, however orthostatics are normal.  Feel like world shifts (vibrates). Sensation lasts <1 minute. No falls. Had vertigo in the past.      Last colonoscopy a long time ago. Inguinal Hernia repair when he was in [address] [place] [address] St are getting better. RLS seems to have essentially resolved. Would you like Flu shot      Assessment/Plan   Education and counseling provided:  Are appropriate based on today's review and evaluation  End-of-Life planning (with patient's consent)  Colorectal cancer screening tests  Lung Cancer Screening     1. Medicare annual wellness visit, subsequent  Javier Lopez was counseled on age-appropriate/ guideline-based risk prevention behaviors and screening for a 77y.o. year old   male . We also discussed adjustments in screening based on family history if necessary. Printed instructions for preventative screening guidelines and healthy behaviors given to patient with after visit summary. 2. Advanced directives, counseling/discussion  See ACP note  -     FULL CODE  3. Personal history of tobacco use, presenting hazards to health  Discussed with patient current guidelines for screening for lung cancer. Current recommendations are to obtain yearly screening LDCT yearly for age 46-80, or until smoke free for 15 years. Patient has 50 pack year history of cigarette smoking and currently quit. Discussed with patient risks and benefits of screening, including over-diagnosis, false positive rate, and total radiation exposure. Patient currently exhibits no signs or symptoms suggestive of lung cancer.   Discussed with patient importance of compliance with yearly annual lung cancer screenings and willingness to undergo diagnosis and treatment if screening scan is positive. In addition, patient was counseled regarding (remaining smoke free/total smoking cessation). -     CT LOW DOSE LUNG CANCER SCREENING; Future  4. Special screening for malignant neoplasm of prostate  5. Encounter for immunization  Unfortunately we did not have the flu vaccine in the office. Patient referred to his pharmacy for his flu vaccine. 6. Benign paroxysmal positional vertigo, unspecified laterality  Will get labs to rule out metabolic cause of his dizziness. .  May use meclizine. Given directions for [employer] Maneuver at home.   -     LITHIUM; Future  -     VITAMIN B12 & FOLATE; Future  -     CBC WITH AUTOMATED DIFF; Future  -     HEMOGLOBIN A1C WITH EAG; Future  -     T PALLIDUM AB; Future  -     TSH 3RD GENERATION; Future  7. Colon cancer screening  Reviewed colon cancer screening recommendations. Given his age, there is uncertainty as to the benefit of additional cancer screening, however with his hx of colon polyps, the benefit likely outweigh the risk. Reviewed options, pt wishes to proceed with FIT testing   -     OCCULT BLOOD IMMUNOASSAY,DIAGNOSTIC; Future  8. Encounter for long-term (current) use of high-risk medication  -     LITHIUM; Future  -     VITAMIN B12 & FOLATE; Future  -     CBC WITH AUTOMATED DIFF; Future  -     HEMOGLOBIN A1C WITH EAG; Future  -     T PALLIDUM AB; Future  -     TSH 3RD GENERATION;  Future       Depression Risk Factor Screening     3 most recent PHQ Screens 2/23/2022   Little interest or pleasure in doing things Not at all   Feeling down, depressed, irritable, or hopeless Not at all   Total Score PHQ 2 0   Trouble falling or staying asleep, or sleeping too much Not at all   Feeling tired or having little energy Not at all   Poor appetite, weight loss, or overeating Not at all   Feeling bad about yourself - or that you are a failure or have let yourself or your family down Not at all   Trouble concentrating on things such as school, work, reading, or watching TV Not at all   Moving or speaking so slowly that other people could have noticed; or the opposite being so fidgety that others notice Not at all   Thoughts of being better off dead, or hurting yourself in some way Not at all   PHQ 9 Score 0   How difficult have these problems made it for you to do your work, take care of your home and get along with others Not difficult at all       Alcohol & Drug Abuse Risk Screen    Do you average more than 1 drink per night or more than 7 drinks a week: No.  Last drink 2008. In the past three months have you have had more than 4 drinks containing alcohol on one occasion: No        Functional Ability and Level of Safety    Hearing: Hearing is good. Activities of Daily Living: The home contains: handrails and grab bars  Patient does total self care , wife drives      Ambulation: with no difficulty     Fall Risk:  Fall Risk Assessment, last 12 mths 2/23/2022   Able to walk? Yes   Fall in past 12 months? 0   Do you feel unsteady? 0   Are you worried about falling 0      Abuse Screen:  Patient is not abused       Cognitive Screening    Has your family/caregiver stated any concerns about your memory: no.  Some worsening with proper names. Having some troubles getting words.        Health Maintenance Due     Health Maintenance Due   Topic Date Due    Depression Monitoring  Never done    Shingrix Vaccine Age 49> (1 of 2) Never done    Colorectal Cancer Screening Combo  10/02/2018    A1C test (Diabetic or Prediabetic)  08/07/2019    Low dose CT lung screening  10/24/2019    Medicare Yearly Exam  10/08/2020    Flu Vaccine (1) 09/01/2021       Patient Care Team   Patient Care Team:  April Ramirez MD as PCP - General (Family Medicine)  April Ramirez MD as PCP - [facility] Empaneled Provider  Guille Roberts PsyD (Psychology)  Radha Medellin MD (Endocrinology)  Jeremias Mendieta MD (Psychiatry)  Marianna Arleen Marshall MD (Neurology)  Annalise Dietrich MD (Gastroenterology)  Juliann Manuel, RN as Nurse Navigator  Celina Sims MD as Physician (Sleep Medicine)  Bboby Herbert MD (Psychiatry)     No pulmonology or GI since returning from [facility]    History     Patient Active Problem List   Diagnosis Code    Bipolar affective [facility]) F31.9    Essential tremor G25.0    Nonspecific abnormal electrocardiogram (ECG) (EKG) R94.31    Pulmonary nodule R91.1    COPD (chronic obstructive pulmonary disease) ([address] J44.9    Colon polyps K63.5    Stopped smoking with greater than 30 pack year history Z87.891    Urinary incontinence, stress, male N39.3    Prostate cancer ([address] C61    Anemia D64.9    Hughes esophagus K22.70    Memory difficulties R41.3    Diverticulitis K57.92    Insomnia G47.00    Restless leg syndrome G25.81    GERD (gastroesophageal reflux disease) K21.9    Thyroid nodule E04.1    Healthcare maintenance Z00.00    Elevated glucose R73.09    Lipid disorder E78.9    Nuclear cataract [id number]    Sensory Polyneuropathy G62.9     Past Medical History:   Diagnosis Date    Alzheimer disease ([address] 11/2016    Hughes's esophagus     Hughes's esophagus with esophagitis     Bipolar disorder ([address]     CAD (coronary artery disease)     minor calcification in heart    Chronic obstructive pulmonary disease (HCC)     mild    Diverticulitis     Essential tremor     Prostate cancer ([address] 9/2004    prostatectomy    Psychotic disorder ([address]     Restless leg syndrome     Sensory Polyneuropathy       Past Surgical History:   Procedure Laterality Date    HX GI  05/20/2016    laparoscopic sigmoidectomy    HX HEENT      deviated septum repair    HX HERNIA REPAIR      R [facility] inguinal[facility]    HX PROSTATECTOMY  2004    HX TONSILLECTOMY      UT CYSTOSCOPY,INSERT URETERAL STENT  05/20/2016    cystoscopy and placement of bilateral ureteral stents     Current Outpatient Medications   Medication Sig Dispense Refill    milk thistle/NAC/dandel/turmer (LIVER COMPLEX PO) Take 500 mg by mouth two (2) times a day.  sertraline (ZOLOFT) 50 mg tablet Take 1 Tab by mouth daily. 90 Tab 3    lithium carbonate 300 mg tablet Take 1 Tab by mouth daily. 90 Tab 3    omeprazole (PRILOSEC) 40 mg capsule Take 40 mg by mouth Daily (before breakfast).  COCONUT OIL PO Take  by mouth. 7.5 mL by mouth each morning, 7.5 mL at midday, 5 mL at dinner and 5 mL at bedtime      multivitamin (ONE A DAY) tablet Take 1 Tab by mouth every evening.  Aspirin, Buffered 81 mg tab Take 81 mg by mouth daily.  varicella-zoster recombinant, PF, (SHINGRIX, PF,) 50 mcg/0.5 mL susr injection 0.5mL by IntraMUSCular route once now and then repeat in 2-6 months (Patient not taking: Reported on 2022) 0.5 mL 1    primidone (MYSOLINE) 50 mg tablet TAKE 1 TAB BY MOUTH DAILY(WITH DINNER). (Patient not taking: Reported on 2022) 60 Tab 5    clotrimazole-betamethasone (LOTRISONE) topical cream Apply  to affected area two (2) times a day.  (Patient not taking: Reported on 2022)       Allergies   Allergen Reactions    Sulfa (Sulfonamide Antibiotics) Nausea and Vomiting     Oral only       Family History   Problem Relation Age of Onset    Diabetes Father     Gout Father     Cancer Maternal Grandmother         stomach cancer    Heart Disease Maternal Grandmother     Stroke Maternal Grandmother     Cancer Other     Heart Disease Other      Social History     Tobacco Use    Smoking status: Former Smoker     Packs/day: 1.00     Years: 50.00     Pack years: 50.00     Quit date: 2014     Years since quittin.4    Smokeless tobacco: Never Used   Substance Use Topics    Alcohol use: No         Rena Redd MD

## 2022-02-23 NOTE — PROGRESS NOTES
Chief Complaint   Patient presents with   [facility] Annual Wellness Visit     Patient is here for his medicare wellness. States his last colonoscopy was \"a long time ago\".  Dizziness     Has noticed dizziness when he goes from lying down to sitting up and also from standing to lying down. States this has happened to him once before. Vitals:    02/23/22 1502 02/23/22 1505 02/23/22 1506 02/23/22 1507   BP: 125/63      BP 2:  114/60 127/66 132/65   BP 1 Location: Left arm Left arm Left arm Left arm   BP Patient Position: Sitting Lying Sitting Standing   BP Cuff Size: Adult Adult Adult Adult   Pulse: (!) 58      Pulse 2:  58 58 61   Resp: 16      Height: 5' 7\" (1.702 m)      Weight: 158 lb 3.2 oz (71.8 kg)      SpO2: 97%              General Health Questions   -During the past 4 weeks:   -how would you rate your health in general? Good   -how often have you been bothered by feeling dizzy when standing up? When stands up after laying down, started about 1 month ago.   -how much have you been bothered by bodily pain? mildly   -Have you noticed any hearing difficulties? no   -has your physical and emotional health limited your social activities with family or friends? no    Emotional Health Questions   -Do you have a history of depression, anxiety, or emotional problems? Yes-depression  -Over the past 2 weeks, have you felt down, depressed or hopeless? no  -Over the past 2 weeks, have you felt little interest or pleasure in doing things? no    Health Habits   Please describe your diet habits: \"very good, not a lot of meat, eats a lot of fruits and vegetables. Do you get 5 servings of fruits or vegetables daily? no  Do you exercise regularly? Yes, when it isn't too cold he walks everyday. Activities of Daily Living and Functional Status   -Do you need help with eating, walking, dressing, bathing, toileting, the phone, transportation, shopping, preparing meals, housework, laundry, medications or managing money? no  -In the past four weeks, was someone available to help you if you needed and wanted help with anything? yes  -Are you confident are you that you can control and manage most of your health problems? yes  -Have you been given information to help you keep track of your medications? yes  -How often do you have trouble taking your medications as prescribed? never    Fall Risk and Home Safety   Have you fallen 2 or more times in the past year? no  Does your home have rugs in the hallways? no, Do you have grab bars in the bathrooms? yes, Does your home have handrails on the stairs? yes, Do you have adequate lighting in your home? yes  Do you have smoke detectors and check them regularly?  yes  Do you have difficulties driving a car/vehicle? no  Do you always fasten your seat belt when you are in a car? yes

## 2022-02-23 NOTE — PATIENT INSTRUCTIONS
Medicare Wellness Visit, Male    The best way to live healthy is to have a lifestyle where you eat a well-balanced diet, exercise regularly, limit alcohol use, and quit all forms of tobacco/nicotine, if applicable. Regular preventive services are another way to keep healthy. Preventive services (vaccines, screening tests, monitoring & exams) can help personalize your care plan, which helps you manage your own care. Screening tests can find health problems at the earliest stages, when they are easiest to treat. [facility] follows the current, evidence-based guidelines published by the [facility] ([facility]) when recommending preventive services for our patients. Because we follow these guidelines, sometimes recommendations change over time as research supports it. (For example, a prostate screening blood test is no longer routinely recommended for men with no symptoms). Of course, you and your doctor may decide to screen more often for some diseases, based on your risk and co-morbidities (chronic disease you are already diagnosed with). Preventive services for you include:  - Medicare offers their members a free annual wellness visit, which is time for you and your primary care provider to discuss and plan for your preventive service needs. Take advantage of this benefit every year!  -All adults over age 72 should receive the recommended pneumonia vaccines. Current USPSTF guidelines recommend a series of two vaccines for the best pneumonia protection.   -All adults should have a flu vaccine yearly and tetanus vaccine every 10 years.  -All adults age 48 and older should receive the shingles vaccines (series of two vaccines).        -All adults age 38-68 who are overweight should have a diabetes screening test once every three years.   -Other screening tests & preventive services for persons with diabetes include: an eye exam to screen for diabetic Thyroid US images reviewed.   There is a R mid complex nodule, measuring 2.6 x 1.8 x 2.1cm.   No prior imaging in care everywhere of [facility]. (not sure if the nodule grew in size or not).   FNA of the R lobe done in 2011 ( I am assuming of the above nodule but no notes to confirm).     If the nodule grew significantly in size then needs FNA and OV ( we need prior imaging to determine that).   Looks like Dr Glez did FNA.     If no change in size then OV.      retinopathy, a kidney function test, a foot exam, and stricter control over your cholesterol.   -Cardiovascular screening for adults with routine risk involves an electrocardiogram (ECG) at intervals determined by the provider.   -Colorectal cancer screening should be done for adults age 54-65 with no increased risk factors for colorectal cancer. There are a number of acceptable methods of screening for this type of cancer. Each test has its own benefits and drawbacks. Discuss with your provider what is most appropriate for you during your annual wellness visit. The different tests include: colonoscopy (considered the best screening method), a fecal occult blood test, a fecal DNA test, and sigmoidoscopy.  -All adults born between [facility] should be screened once for Hepatitis C.  -An Abdominal Aortic Aneurysm (AAA) Screening is recommended for men age 73-68 who has ever smoked in their lifetime. Here is a list of your current Health Maintenance items (your personalized list of preventive services) with a due date:  Health Maintenance Due   Topic Date Due    Shingles Vaccine (1 of 2) Never done    Colorectal Screening  10/02/2018    Hemoglobin A1C    08/07/2019    Smoker or Former Smoker - [address]  10/24/2019    Annual Well Visit  10/08/2020    Yearly Flu Vaccine (1) 09/01/2021          Colon Cancer Screening: Care Instructions  Your Care Instructions    Colorectal cancer occurs in the colon or rectum. That's the lower part of your digestive system. It is the second-leading cause of cancer deaths in the [place]. It often starts with small growths called polyps in the colon or rectum. Polyps are usually found with screening tests. Depending on the type of test, any polyps found may be removed during the tests. Colorectal cancer usually does not cause symptoms at first. But regular tests can help find it early, before it spreads and becomes harder to treat.  Experts advise routine tests for colon cancer for people starting at age 48. And they advise people with a higher risk of colon cancer to get tested sooner. Talk with your doctor about when you should start testing. Discuss which tests you need. Follow-up care is a key part of your treatment and safety. Be sure to make and go to all appointments, and call your doctor if you are having problems. It's also a good idea to know your test results and keep a list of the medicines you take. What are the main screening tests for colon cancer? · Stool tests. These include the fecal immunochemical test (FIT) and the fecal occult blood test (FOBT). These tests check stool samples for signs of cancer. If your test is positive, you will need to have a colonoscopy. · Sigmoidoscopy. This test lets your doctor look at the lining of your rectum and the lowest part of your colon. Your doctor uses a lighted tube called a sigmoidoscope. This test can't find cancers or polyps in the upper part of your colon. In some cases, polyps that are found can be removed. But if your doctor finds polyps, you will need to have a colonoscopy to check the upper part of your colon. · Colonoscopy. This test lets your doctor look at the lining of your rectum and your entire colon. The doctor uses a thin, flexible tool called a colonoscope. It can also be used to remove polyps or get a tissue sample (biopsy). What tests do you need? The following guidelines are for people age 48 and over who are not at high risk for colorectal cancer. You may have at least one of these tests as directed by your doctor. · Fecal immunochemical test (FIT) or fecal occult blood test (FOBT) every year  · Sigmoidoscopy every 5 years  · Colonoscopy every 10 years  If you are age 68 to 80, you can work with your doctor to decide if screening is a good option. If you are age 80 or older, your doctor will likely advise that screening is not helpful.   Talk with your doctor about when you need to be tested. And discuss which tests are right for you. Your doctor may recommend earlier or more frequent testing if you:  · Have had colorectal cancer before. · Have had colon polyps. · Have symptoms of colorectal cancer. These include blood in your stool and changes in your bowel habits. · Have a parent, brother or sister, or child with colon polyps or colorectal cancer. · Have a bowel disease. This includes ulcerative colitis and Crohn's disease. · Have a rare polyp syndrome that runs in families, such as familial adenomatous polyposis (FAP). · Have had radiation treatments to the belly or pelvis. When should you call for help? Watch closely for changes in your health, and be sure to contact your doctor if:    · You have any changes in your bowel habits.     · You have any problems. Where can you learn more? Go to http://www.gray.com/. Enter M541 in the search box to learn more about \"Colon Cancer Screening: Care Instructions. \"  Current as of: March 28, 2018  Content Version: 11.8  © 1617-2376 [employer]. Care instructions adapted under license by [employer] (which disclaims liability or warranty for this information). If you have questions about a medical condition or this instruction, always ask your healthcare professional. [place] [address] any warranty or liability for your use of this information. Preventing Falls: Care Instructions  Your Care Instructions    Getting around your home safely can be a challenge if you have injuries or health problems that make it easy for you to fall. Loose rugs and furniture in walkways are among the dangers for many older people who have problems walking or who have poor eyesight. People who have conditions such as arthritis, osteoporosis, or dementia also have to be careful not to fall. You can make your home safer with a few simple measures.   Follow-up care is a key part of your treatment and safety. Be sure to make and go to all appointments, and call your doctor if you are having problems. It's also a good idea to know your test results and keep a list of the medicines you take. How can you care for yourself at home? Taking care of yourself  · You may get dizzy if you do not drink enough water. To prevent dehydration, drink plenty of fluids, enough so that your urine is light yellow or clear like water. Choose water and other caffeine-free clear liquids. If you have kidney, heart, or liver disease and have to limit fluids, talk with your doctor before you increase the amount of fluids you drink. · Exercise regularly to improve your strength, muscle tone, and balance. Walk if you can. Swimming may be a good choice if you cannot walk easily. · Have your vision and hearing checked each year or any time you notice a change. If you have trouble seeing and hearing, you might not be able to avoid objects and could lose your balance. · Know the side effects of the medicines you take. Ask your doctor or pharmacist whether the medicines you take can affect your balance. Sleeping pills or sedatives can affect your balance. · Limit the amount of alcohol you drink. Alcohol can impair your balance and other senses. · Ask your doctor whether calluses or corns on your feet need to be removed. If you wear loose-fitting shoes because of calluses or corns, you can lose your balance and fall. · Talk to your doctor if you have numbness in your feet. Preventing falls at home  · Remove raised doorway thresholds, throw rugs, and clutter. Repair loose carpet or raised areas in the floor. · Move furniture and electrical cords to keep them out of walking paths. · Use nonskid floor wax, and wipe up spills right away, especially on ceramic tile floors. · If you use a walker or cane, put rubber tips on it.  If you use crutches, clean the bottoms of them regularly with an abrasive pad, such as steel wool.  · Keep your house well lit, especially [place], and outside walkways. Use night-lights in areas such as hallways and bathrooms. Add extra light switches or use remote switches (such as switches that go on or off when you clap your hands) to make it easier to turn lights on if you have to get up during the night. · Install sturdy handrails on stairways. · Move items in your cabinets so that the things you use a lot are on the lower shelves (about waist level). · Keep a cordless phone and a flashlight with new batteries by your bed. If possible, put a phone in each of the main rooms of your house, or carry a cell phone in case you fall and cannot reach a phone. Or, you can wear a device around your neck or wrist. You push a button that sends a signal for help. · Wear low-heeled shoes that fit well and give your feet good support. Use footwear with nonskid soles. Check the heels and soles of your shoes for wear. Repair or replace worn heels or soles. · Do not wear socks without shoes on wood floors. · Walk on the grass when the sidewalks are slippery. If you live in an area that gets snow and ice in the winter, sprinkle salt on slippery steps and sidewalks. Preventing falls in the bath  · Install grab bars and nonskid mats inside and outside your shower or tub and near the toilet and sinks. · Use shower chairs and bath benches. · Use a hand-held shower head that will allow you to sit while showering. · Get into a tub or shower by putting the weaker leg in first. Get out of a tub or shower with your strong side first.  · Repair loose toilet seats and consider installing a raised toilet seat to make getting on and off the toilet easier. · Keep your bathroom door unlocked while you are in the shower. Where can you learn more? Go to http://www.mak.com/. Enter [id number] [id number] in the search box to learn more about \"Preventing Falls: Care Instructions. \"  Current as of: March 16, 2018  Content Version: 11.8  © 8342-0645 [employer]. Care instructions adapted under license by [employer] (which disclaims liability or warranty for this information). If you have questions about a medical condition or this instruction, always ask your healthcare professional. [address] any warranty or liability for your use of this information. Vertigo: Care Instructions  Your Care Instructions     Vertigo is the feeling that you or your surroundings are moving when there is no actual movement. It is often described as a feeling of spinning, whirling, falling, or tilting. Vertigo may make you vomit or feel nauseated. You may have trouble standing or walking and may lose your balance. Vertigo is often related to an inner ear problem, but it can have other more serious causes. If vertigo continues, you may need more tests to find its cause. Follow-up care is a key part of your treatment and safety. Be sure to make and go to all appointments, and call your doctor if you are having problems. It's also a good idea to know your test results and keep a list of the medicines you take. How can you care for yourself at home? · Do not lie flat on your back. Prop yourself up slightly. This may reduce the spinning feeling. Keep your eyes open. · Move slowly so that you do not fall. · If your doctor recommends medicine, take it exactly as directed. · Do not drive while you are having vertigo. Certain exercises, called Khan-Daroff exercises, can help decrease vertigo. To do Khan-Daroff exercises:  · Sit on the edge of a bed or sofa and quickly lie down on the side that causes the worst vertigo. Lie on your side with your ear down. · Stay in this position for at least 30 seconds or until the vertigo goes away. · Sit up. If this causes vertigo, wait for it to stop. · Repeat the procedure on the other side. · Repeat this 10 times.  Do these exercises 2 times a day until the vertigo is gone. When should you call for help? Call 911 anytime you think you may need emergency care. For example, call if:    · You passed out (lost consciousness).     · You have symptoms of a stroke. These may include:  ? Sudden numbness, tingling, weakness, or loss of movement in your face, arm, or leg, especially on only one side of your body. ? Sudden vision changes. ? Sudden trouble speaking. ? Sudden confusion or trouble understanding simple statements. ? Sudden problems with walking or balance. ? A sudden, severe headache that is different from past headaches. Call your doctor now or seek immediate medical care if:    · Vertigo occurs with a fever, a headache, or ringing in your ears.     · You have new or increased nausea and vomiting. Watch closely for changes in your health, and be sure to contact your doctor if:    · Vertigo gets worse or happens more often.     · Vertigo has not gotten better after 2 weeks. Where can you learn more? Go to http://www.gray.com/  Enter U644 in the search box to learn more about \"Vertigo: Care Instructions. \"  Current as of: December 2, 2020               Content Version: 13.0  © 6598-0611 [employer]. Care instructions adapted under license by [employer] (which disclaims liability or warranty for this information). If you have questions about a medical condition or this instruction, always ask your healthcare professional. [place] [address] any warranty or liability for your use of this information. Epley Maneuver at Home for Vertigo: Exercises  Introduction  Vertigo is a spinning or whirling sensation when you move your head. Your doctor may have moved you in different positions to help your vertigo get better faster. This is called the Epley maneuver. Your doctor also may have asked you to do these exercises at home.   Do the exercises as often as your doctor recommends. If your vertigo is getting worse, your doctor may have you change the exercise or stop it. Step 1  Step 1    1. Sit on the edge of a bed or sofa. Step 2    1. Turn your head 45 degrees in the direction your doctor told you to. This should be toward the ear that causes the most vertigo for you. In this picture, the woman is turning toward her left ear. Step 3    1. Tilt yourself backward until you are lying on your back. Your head should still be at a 45-degree turn. Your head should be about midway between looking straight ahead and looking out to your side. Hold for 30 seconds. If you have vertigo, stay in this position until it stops. Step 4    1. Turn your head 90 degrees toward the ear that has the least vertigo. In this picture, the woman is turning to the right because she has vertigo on her left side. The point of your chin should be raised and over your shoulder. Hold for 30 seconds. Step 5    1. Roll onto the side with the least vertigo. You should now be looking at the floor. Hold for 30 seconds. Follow-up care is a key part of your treatment and safety. Be sure to make and go to all appointments, and call your doctor if you are having problems. It's also a good idea to know your test results and keep a list of the medicines you take. Where can you learn more? Go to http://www.gray.com/  Enter P834 in the search box to learn more about \"Epley Maneuver at Home for Vertigo: Exercises. \"  Current as of: April 8, 2021               Content Version: 13.0  © 2006-2021 [employer]. Care instructions adapted under license by [employer] (which disclaims liability or warranty for this information). If you have questions about a medical condition or this instruction, always ask your healthcare professional. [address] any warranty or liability for your use of this information.

## 2022-02-24 LAB
ALBUMIN SERPL-MCNC: 4 G/DL (ref 3.5–5)
ALBUMIN/GLOB SERPL: 1.4 {RATIO} (ref 1.1–2.2)
ALP SERPL-CCNC: 61 U/L (ref 45–117)
ALT SERPL-CCNC: 26 U/L (ref 12–78)
ANION GAP SERPL CALC-SCNC: 3 MMOL/L (ref 5–15)
AST SERPL-CCNC: 19 U/L (ref 15–37)
BASOPHILS # BLD: 0.1 K/UL (ref 0–0.1)
BASOPHILS NFR BLD: 1 % (ref 0–1)
BILIRUB SERPL-MCNC: 0.3 MG/DL (ref 0.2–1)
BUN SERPL-MCNC: 19 MG/DL (ref 6–20)
BUN/CREAT SERPL: 16 (ref 12–20)
CALCIUM SERPL-MCNC: 9.4 MG/DL (ref 8.5–10.1)
CHLORIDE SERPL-SCNC: 110 MMOL/L (ref 97–108)
CO2 SERPL-SCNC: 25 MMOL/L (ref 21–32)
CREAT SERPL-MCNC: 1.21 MG/DL (ref 0.7–1.3)
DATE LAST DOSE: NORMAL
DIFFERENTIAL METHOD BLD: ABNORMAL
EOSINOPHIL # BLD: 0.3 K/UL (ref 0–0.4)
EOSINOPHIL NFR BLD: 4 % (ref 0–7)
ERYTHROCYTE [DISTWIDTH] IN BLOOD BY AUTOMATED COUNT: 12.5 % (ref 11.5–14.5)
EST. AVERAGE GLUCOSE BLD GHB EST-MCNC: 128 MG/DL
FOLATE SERPL-MCNC: 30.9 NG/ML (ref 5–21)
GLOBULIN SER CALC-MCNC: 2.8 G/DL (ref 2–4)
GLUCOSE SERPL-MCNC: 98 MG/DL (ref 65–100)
HBA1C MFR BLD: 6.1 % (ref 4–5.6)
HCT VFR BLD AUTO: 40.2 % (ref 36.6–50.3)
HGB BLD-MCNC: 12.8 G/DL (ref 12.1–17)
IMM GRANULOCYTES # BLD AUTO: 0 K/UL (ref 0–0.04)
IMM GRANULOCYTES NFR BLD AUTO: 0 % (ref 0–0.5)
LITHIUM SERPL-SCNC: 0.62 MMOL/L (ref 0.6–1.2)
LYMPHOCYTES # BLD: 1.4 K/UL (ref 0.8–3.5)
LYMPHOCYTES NFR BLD: 20 % (ref 12–49)
MAGNESIUM SERPL-MCNC: 2.1 MG/DL (ref 1.6–2.4)
MCH RBC QN AUTO: 33.1 PG (ref 26–34)
MCHC RBC AUTO-ENTMCNC: 31.8 G/DL (ref 30–36.5)
MCV RBC AUTO: 103.9 FL (ref 80–99)
MONOCYTES # BLD: 0.5 K/UL (ref 0–1)
MONOCYTES NFR BLD: 7 % (ref 5–13)
NEUTS SEG # BLD: 4.8 K/UL (ref 1.8–8)
NEUTS SEG NFR BLD: 68 % (ref 32–75)
NRBC # BLD: 0 K/UL (ref 0–0.01)
NRBC BLD-RTO: 0 PER 100 WBC
PLATELET # BLD AUTO: 200 K/UL (ref 150–400)
PMV BLD AUTO: 9.7 FL (ref 8.9–12.9)
POTASSIUM SERPL-SCNC: 4.2 MMOL/L (ref 3.5–5.1)
PROT SERPL-MCNC: 6.8 G/DL (ref 6.4–8.2)
PSA SERPL-MCNC: 0 NG/ML (ref 0.01–4)
RBC # BLD AUTO: 3.87 M/UL (ref 4.1–5.7)
REPORTED DOSE,DOSE: NORMAL UNITS
REPORTED DOSE/TIME,TMG: NORMAL
SODIUM SERPL-SCNC: 138 MMOL/L (ref 136–145)
TSH SERPL DL<=0.05 MIU/L-ACNC: 1.69 UIU/ML (ref 0.36–3.74)
VIT B12 SERPL-MCNC: 534 PG/ML (ref 193–986)
WBC # BLD AUTO: 6.9 K/UL (ref 4.1–11.1)

## 2022-02-25 LAB — T PALLIDUM AB SER QL IA: NON REACTIVE

## 2022-03-02 ENCOUNTER — TELEPHONE (OUTPATIENT)
Dept: FAMILY MEDICINE CLINIC | Age: 77
End: 2022-03-02

## 2022-03-02 DIAGNOSIS — M25.519 NECK AND SHOULDER PAIN: Primary | ICD-10-CM

## 2022-03-02 DIAGNOSIS — Z12.11 COLON CANCER SCREENING: ICD-10-CM

## 2022-03-02 DIAGNOSIS — M54.2 NECK AND SHOULDER PAIN: Primary | ICD-10-CM

## 2022-03-02 NOTE — TELEPHONE ENCOUNTER
Pt was having neck and shoulder pain. He was seen with his wife at [address] [place] [address] PT and sports rehab. They told him he just needed a referral to the office. He wants to know if he would need an appt or is it possible to just fax over a referral.    He left a fax and office number for them in case you can just fax it over. Fax: [phone]  Phone: [phone]    His wife was with him and she stated that if they needed an appt or any other information someone could call her (I also asked him for his consent and he said it was okay to call her).  His wife's phone number is [phone]    *I also left a copy of his receipt in the box

## 2022-03-04 LAB — HEMOCCULT STL QL IA: NEGATIVE

## 2023-05-17 RX ORDER — LITHIUM CARBONATE 300 MG
300 TABLET ORAL DAILY
COMMUNITY
Start: 2018-09-26

## 2023-05-17 RX ORDER — OMEPRAZOLE 40 MG/1
40 CAPSULE, DELAYED RELEASE ORAL
COMMUNITY

## 2024-03-27 ENCOUNTER — ANESTHESIA EVENT (OUTPATIENT)
Facility: HOSPITAL | Age: 79
End: 2024-03-27
Payer: MEDICARE

## 2024-03-27 ENCOUNTER — ANESTHESIA (OUTPATIENT)
Facility: HOSPITAL | Age: 79
End: 2024-03-27
Payer: MEDICARE

## 2024-03-27 ENCOUNTER — HOSPITAL ENCOUNTER (OUTPATIENT)
Facility: HOSPITAL | Age: 79
Setting detail: OUTPATIENT SURGERY
Discharge: HOME | End: 2024-03-27
Attending: INTERNAL MEDICINE | Admitting: SPECIALIST
Payer: MEDICARE

## 2024-03-27 VITALS
DIASTOLIC BLOOD PRESSURE: 59 MMHG | OXYGEN SATURATION: 100 % | BODY MASS INDEX: 22.28 KG/M2 | TEMPERATURE: 97.6 F | RESPIRATION RATE: 16 BRPM | HEIGHT: 67 IN | SYSTOLIC BLOOD PRESSURE: 138 MMHG | WEIGHT: 141.98 LBS | HEART RATE: 58 BPM

## 2024-03-27 PROCEDURE — 3600007512: Performed by: INTERNAL MEDICINE

## 2024-03-27 PROCEDURE — 3600007502: Performed by: INTERNAL MEDICINE

## 2024-03-27 PROCEDURE — 2500000003 HC RX 250 WO HCPCS: Performed by: NURSE ANESTHETIST, CERTIFIED REGISTERED

## 2024-03-27 PROCEDURE — 88305 TISSUE EXAM BY PATHOLOGIST: CPT

## 2024-03-27 PROCEDURE — 3700000000 HC ANESTHESIA ATTENDED CARE: Performed by: INTERNAL MEDICINE

## 2024-03-27 PROCEDURE — 7100000011 HC PHASE II RECOVERY - ADDTL 15 MIN: Performed by: INTERNAL MEDICINE

## 2024-03-27 PROCEDURE — 2709999900 HC NON-CHARGEABLE SUPPLY: Performed by: INTERNAL MEDICINE

## 2024-03-27 PROCEDURE — C1889 IMPLANT/INSERT DEVICE, NOC: HCPCS | Performed by: INTERNAL MEDICINE

## 2024-03-27 PROCEDURE — 6360000002 HC RX W HCPCS: Performed by: NURSE ANESTHETIST, CERTIFIED REGISTERED

## 2024-03-27 PROCEDURE — 3700000001 HC ADD 15 MINUTES (ANESTHESIA): Performed by: INTERNAL MEDICINE

## 2024-03-27 PROCEDURE — 2580000003 HC RX 258: Performed by: INTERNAL MEDICINE

## 2024-03-27 PROCEDURE — 7100000010 HC PHASE II RECOVERY - FIRST 15 MIN: Performed by: INTERNAL MEDICINE

## 2024-03-27 DEVICE — WORKING LENGTH 235CM, WORKING CHANNEL 2.8MM
Type: IMPLANTABLE DEVICE | Site: ASCENDING COLON | Status: FUNCTIONAL
Brand: RESOLUTION 360 CLIP

## 2024-03-27 RX ORDER — SODIUM CHLORIDE 0.9 % (FLUSH) 0.9 %
5-40 SYRINGE (ML) INJECTION EVERY 12 HOURS SCHEDULED
Status: CANCELLED | OUTPATIENT
Start: 2024-03-27

## 2024-03-27 RX ORDER — SODIUM CHLORIDE 0.9 % (FLUSH) 0.9 %
5-40 SYRINGE (ML) INJECTION PRN
Status: CANCELLED | OUTPATIENT
Start: 2024-03-27

## 2024-03-27 RX ORDER — SODIUM CHLORIDE 0.9 % (FLUSH) 0.9 %
5-40 SYRINGE (ML) INJECTION PRN
Status: DISCONTINUED | OUTPATIENT
Start: 2024-03-27 | End: 2024-03-27 | Stop reason: HOSPADM

## 2024-03-27 RX ORDER — ONDANSETRON 2 MG/ML
4 INJECTION INTRAMUSCULAR; INTRAVENOUS EVERY 6 HOURS PRN
Status: CANCELLED | OUTPATIENT
Start: 2024-03-27

## 2024-03-27 RX ORDER — SODIUM CHLORIDE 9 MG/ML
INJECTION, SOLUTION INTRAVENOUS PRN
Status: CANCELLED | OUTPATIENT
Start: 2024-03-27

## 2024-03-27 RX ORDER — SODIUM CHLORIDE 9 MG/ML
25 INJECTION, SOLUTION INTRAVENOUS PRN
Status: DISCONTINUED | OUTPATIENT
Start: 2024-03-27 | End: 2024-03-27 | Stop reason: HOSPADM

## 2024-03-27 RX ORDER — ONDANSETRON 4 MG/1
4 TABLET, ORALLY DISINTEGRATING ORAL EVERY 8 HOURS PRN
Status: CANCELLED | OUTPATIENT
Start: 2024-03-27

## 2024-03-27 RX ORDER — PROPOFOL 10 MG/ML
INJECTION, EMULSION INTRAVENOUS CONTINUOUS PRN
Status: DISCONTINUED | OUTPATIENT
Start: 2024-03-27 | End: 2024-03-27 | Stop reason: SDUPTHER

## 2024-03-27 RX ORDER — SODIUM CHLORIDE 0.9 % (FLUSH) 0.9 %
5-40 SYRINGE (ML) INJECTION EVERY 12 HOURS SCHEDULED
Status: DISCONTINUED | OUTPATIENT
Start: 2024-03-27 | End: 2024-03-27 | Stop reason: HOSPADM

## 2024-03-27 RX ADMIN — PROPOFOL 90 MG: 10 INJECTION, EMULSION INTRAVENOUS at 14:34

## 2024-03-27 RX ADMIN — PROPOFOL 30 MG: 10 INJECTION, EMULSION INTRAVENOUS at 14:37

## 2024-03-27 RX ADMIN — PROPOFOL 120 MCG/KG/MIN: 10 INJECTION, EMULSION INTRAVENOUS at 14:33

## 2024-03-27 RX ADMIN — LIDOCAINE HYDROCHLORIDE 40 MG: 20 INJECTION, SOLUTION INFILTRATION; PERINEURAL at 14:34

## 2024-03-27 RX ADMIN — SODIUM CHLORIDE: 9 INJECTION, SOLUTION INTRAVENOUS at 14:29

## 2024-03-27 ASSESSMENT — PAIN - FUNCTIONAL ASSESSMENT: PAIN_FUNCTIONAL_ASSESSMENT: 0-10

## 2024-03-27 NOTE — H&P
.Pre-Endoscopy H&P Update  Chief complaint/HPI/ROS:  The indication for the procedure, the patient's history and the patient's current medications are reviewed prior to the procedure and that data is reported on the H&P to which this document is attached.  Any significant complaints with regard to organ systems will be noted, and if not mentioned then a review of systems is not contributory.  Past Medical History:   Diagnosis Date    Alzheimer disease (HCC) 2016    Rain's esophagus     Rain's esophagus with esophagitis     Bipolar disorder (HCC)     CAD (coronary artery disease)     minor calcification in heart    Chronic obstructive pulmonary disease (HCC)     mild    Diverticulitis     Essential tremor     Polyneuropathy     Prostate cancer (HCC) 2004    prostatectomy    Psychotic disorder ([facility])     Restless leg syndrome       Past Surgical History:   Procedure Laterality Date    CYSTOSCOPY,INSERT URETERAL STENT  2016    cystoscopy and placement of bilateral ureteral stents    GI  2016    laparoscopic sigmoidectomy    HEENT      deviated septum repair    HERNIA REPAIR      R side inguinal, done in [facility]    PROSTATECTOMY  2004    TONSILLECTOMY       Social   Social History     Tobacco Use    Smoking status: Former     Current packs/day: 0.00     Types: Cigarettes     Quit date: 2014     Years since quittin.5    Smokeless tobacco: Never   Substance Use Topics    Alcohol use: No      Family History   Problem Relation Age of Onset    Heart Disease Other     Cancer Other     Stroke Maternal Grandmother     Heart Disease Maternal Grandmother     Cancer Maternal Grandmother         stomach cancer    Gout Father     Diabetes Father       Allergies   Allergen Reactions    Sulfa Antibiotics Nausea And Vomiting     Oral only      Prior to Admission Medications   Prescriptions Last Dose Informant Patient Reported? Taking?   COCONUT OIL PO   Yes No   Sig: Take by mouth   LORazepam (ATIVAN) 1  MG tablet   Yes No   Sig: Take 1 tablet by mouth every 8 hours as needed for Anxiety. Max Daily Amount: 3 mg   lithium 300 MG tablet   Yes No   Sig: Take 1 tablet by mouth daily   omeprazole (PRILOSEC) 40 MG delayed release capsule   Yes No   Sig: Take 1 capsule by mouth every morning (before breakfast)   sertraline (ZOLOFT) 50 MG tablet   Yes No   Sig: Take 1 tablet by mouth daily      Facility-Administered Medications: None       PHYSICAL EXAM:  The patient is examined immediately prior to the procedure.  There were no vitals filed for this visit.  Gen: Appears comfortable, no distress.  Pulm: comfortable respirations with no abnormal audible breath sounds  HEART: well perfused, no abnormal audible heart sounds  GI: abdomen flat.    PLAN:  Informed consent discussion held, patient afforded an opportunity to ask questions and all questions answered.  After being advised of the risks, benefits, and alternatives, the patient requested that we proceed and indicated so on a written consent form.      Will proceed with procedure as planned.  Waldo Schulz MD

## 2024-03-27 NOTE — ANESTHESIA POSTPROCEDURE EVALUATION
Department of Anesthesiology  Postprocedure Note    Patient: Myles Hutchinson  MRN: [id number]  YOB: 1945  Date of evaluation: 3/27/2024    Procedure Summary       Date: 03/27/24 Room / Location: [place] [address] / [facility] ENDOSCOPY    Anesthesia Start: 1429 Anesthesia Stop: 1455    Procedures:       COLONOSCOPY (Lower GI Region)      ESOPHAGOGASTRODUODENOSCOPY (Upper GI Region)      ESOPHAGOGASTRODUODENOSCOPY BIOPSY (Upper GI Region)      COLONOSCOPY POLYPECTOMY SNARE/COLD BIOPSY (Lower GI Region) Diagnosis:       Rain's esophagus without dysplasia      H/O adenomatous polyp of colon      (Rain's esophagus without dysplasia [K22.70])      (H/O adenomatous polyp of colon [Z86.010])    Surgeons: Waldo Schulz MD Responsible Provider: Ian Conner MD    Anesthesia Type: MAC ASA Status: 3            Anesthesia Type: MAC    Cinthya Phase I: Cinthya Score: 10    Cinthya Phase II: Cinthya Score: 9    Anesthesia Post Evaluation    Patient location during evaluation: PACU  Patient participation: complete - patient participated  Level of consciousness: awake and alert  Pain score: 0  Airway patency: patent  Nausea & Vomiting: no nausea and no vomiting  Cardiovascular status: blood pressure returned to baseline  Respiratory status: acceptable  Hydration status: euvolemic  Pain management: satisfactory to patient    No notable events documented.

## 2024-03-27 NOTE — DISCHARGE INSTRUCTIONS
.                       [facility]  Formerly [facility]  Waldo William MD  [phone]      March 27, 2024    Myles Hutchinson  YOB: 1945    EGD and COLONOSCOPY DISCHARGE INSTRUCTIONS    If there is redness at IV site you should apply warm compress to area.  If redness or soreness persist contact Dr. William's or your primary care doctor.    There may be a slight amount of blood passed from the rectum.  Gaseous discomfort may develop, but walking, belching will help relieve this.  You may not operate a vehicle for 12 hours  You may not operate machinery or dangerous appliances for rest of today  You may not drink alcoholic beverages for 12 hours  Avoid making any critical decisions for 24 hours    DIET:  You may resume your normal diet, but some patients find that heavy or large meals may lead to indigestion or vomiting.  I suggest a light meal as first food intake.    MEDICATIONS:  The use of some over-the-counter pain medication may lead to bleeding after colon biopsies or polyp removal.  Tylenol (also called acetaminophen) is safe to take even if you have had colonoscopy with polyp removal.  Based on the procedure you had today you may not safely take aspirin or aspirin-like products for the next 7 (seven) days.  Remember that Tylenol (also called acetaminophen) is safe to take after colonoscopy even if you have had biopsies or polyps removed.    ACTIVITY:  You may resume your normal household activities, but it is recommended that you spend the remainder of the day resting -  avoid any strenuous activity.    CALL DR. WILLIAM'S OFFICE IF:  Increasing pain, nausea, vomiting  Abdominal distension (swelling)  Significant new or increased bleeding (oral or rectal)  Fever/Chills  Chest pain/shortness of breath                       Additional instructions:   EGD findings-   Rain's esophagus of 2 cm in length with targeted cold forcep mucosal biopsies obtained  3 cm  large sliding-type hiatal hernia  No evidence of active esophagitis  Normal-appearing mucosa in the stomach and duodenum    Colonoscopy findings-  Colonoscopy to cecum with very good bowel prep and visualization  Diverticula throughout the colon  1 cm sigmoid colon polyp and 4 mm ascending colon polyp fully removed; ascending colon polyp resection site bleeding addressed with endoscopic clip placement    Recommendations:   Continue current medication regimen for Rain's esophagus without change  Repeat EGD in 3 years time for Rain's esophagus awaiting biopsy results today  Repeat colonoscopy in 3 years time for colon polyp surveillance purposes  Increase dietary fiber, maintain adequate fluid intake, maintain regular bowel habits, avoid smoking and vaping and avoid chronic NSAID use to decrease risk of progression of of colonic diverticulosis or associated complications  Avoid use of aspirin or NSAIDs for the next 7 days  Avoid MRI imaging for the next 1 week with endoscopic replacement  We will contact you by patient later in 7-14 business days regarding pathology results.     It was an honor to be your doctor today.  Please let me or my office staff know if you have any feedback about today's procedure.    Waldo Schulz MD    Colonoscopy saves lives, and can prevent colon cancer.  Everyone aged 50 or older needs colonoscopy.  Tell your family and friends: get the test!

## 2024-03-27 NOTE — OP NOTE
.                         [facility]  [facility]  Waldo Schulz MD  [phone]      2024    Esophagogastroduodenoscopy (EGD) Procedure Note  Myles Hutchinson  : 1945  [facility] Medical Record Number: [id number]      Indications:   Rain's esophagus surveillance, well controlled GERD on current PPI regimen  Referring Physician:  Maurilio Figueroa MD  Anesthesia/Sedation: Monitored anesthesia care, see separate note  Endoscopist:  Waldo Schulz MD   Complications:  None  Estimated Blood Loss:  None    Permit:  The indications, risks, benefits and alternatives were reviewed with the patient or their decision maker who was provided an opportunity to ask questions and all questions were answered.  The specific risks of esophagogastroduodenoscopy with conscious sedation were reviewed, including but not limited to anesthetic complication, bleeding, adverse drug reaction, missed lesion, infection, IV site reactions, and intestinal perforation which would lead to the need for surgical repair.  Alternatives to EGD including radiographic imaging, observation without testing, or laboratory testing were reviewed as well as the limitations of those alternatives discussed.  After considering the options and having all their questions answered, the patient or their decision maker provided both verbal and written consent to proceed.       Procedure in Detail:  After obtaining informed consent, positioning of the patient in the left lateral decubitus position, and conduction of a pre-procedure pause or \"time out\" the endoscope was introduced into the mouth and advanced to the duodenum.  A careful inspection was made, and findings or interventions are described below.    Findings:   Esophagus-normal-appearing proximal and midesophagus, distal esophagus there is serpiginous salmon-colored mucosa extending 2 cm proximal to the GE junction with a focal area of increased

## 2024-03-27 NOTE — ANESTHESIA PRE PROCEDURE
Induction: intravenous.      Anesthetic plan and risks discussed with patient.                    Ian Conner MD   3/27/2024

## 2024-03-27 NOTE — PERIOP NOTE
Received recovery report from anesthesia team, see anesthesia note. Abdomen remains soft and non-tender post-procedure. Pt has no complaints at this time and tolerated procedure well. Endoscope was pre-cleaned at the bedside by Pietro Richardson immediately following procedure. Post recovery report given to Dawn Grimadlo RN.

## 2024-10-27 ENCOUNTER — HOSPITAL ENCOUNTER (EMERGENCY)
Facility: HOSPITAL | Age: 79
Discharge: HOME | End: 2024-10-27
Attending: STUDENT IN AN ORGANIZED HEALTH CARE EDUCATION/TRAINING PROGRAM
Payer: MEDICARE

## 2024-10-27 ENCOUNTER — APPOINTMENT (OUTPATIENT)
Facility: HOSPITAL | Age: 79
End: 2024-10-27
Payer: MEDICARE

## 2024-10-27 VITALS
HEART RATE: 58 BPM | BODY MASS INDEX: 21.5 KG/M2 | OXYGEN SATURATION: 96 % | WEIGHT: 137 LBS | DIASTOLIC BLOOD PRESSURE: 69 MMHG | TEMPERATURE: 97.7 F | HEIGHT: 67 IN | SYSTOLIC BLOOD PRESSURE: 133 MMHG | RESPIRATION RATE: 18 BRPM

## 2024-10-27 DIAGNOSIS — S61.215A LACERATION OF LEFT RING FINGER WITHOUT FOREIGN BODY WITHOUT DAMAGE TO NAIL, INITIAL ENCOUNTER: ICD-10-CM

## 2024-10-27 DIAGNOSIS — S61.311A LACERATION OF LEFT INDEX FINGER WITH DAMAGE TO NAIL, FOREIGN BODY PRESENCE UNSPECIFIED, INITIAL ENCOUNTER: ICD-10-CM

## 2024-10-27 DIAGNOSIS — S69.92XA INJURY OF LEFT HAND, INITIAL ENCOUNTER: Primary | ICD-10-CM

## 2024-10-27 DIAGNOSIS — S61.313A LACERATION OF LEFT MIDDLE FINGER WITH DAMAGE TO NAIL, FOREIGN BODY PRESENCE UNSPECIFIED, INITIAL ENCOUNTER: ICD-10-CM

## 2024-10-27 PROCEDURE — 2500000003 HC RX 250 WO HCPCS: Performed by: NURSE PRACTITIONER

## 2024-10-27 PROCEDURE — 73130 X-RAY EXAM OF HAND: CPT

## 2024-10-27 PROCEDURE — 12002 RPR S/N/AX/GEN/TRNK2.6-7.5CM: CPT

## 2024-10-27 PROCEDURE — 6370000000 HC RX 637 (ALT 250 FOR IP): Performed by: NURSE PRACTITIONER

## 2024-10-27 PROCEDURE — 99283 EMERGENCY DEPT VISIT LOW MDM: CPT

## 2024-10-27 RX ORDER — IBUPROFEN 800 MG/1
800 TABLET, FILM COATED ORAL
Status: COMPLETED | OUTPATIENT
Start: 2024-10-27 | End: 2024-10-27

## 2024-10-27 RX ORDER — OXYCODONE AND ACETAMINOPHEN 5; 325 MG/1; MG/1
1 TABLET ORAL EVERY 6 HOURS PRN
Qty: 16 TABLET | Refills: 0 | Status: SHIPPED | OUTPATIENT
Start: 2024-10-27 | End: 2024-10-31

## 2024-10-27 RX ORDER — LIDOCAINE HYDROCHLORIDE 10 MG/ML
20 INJECTION, SOLUTION EPIDURAL; INFILTRATION; INTRACAUDAL; PERINEURAL ONCE
Status: DISCONTINUED | OUTPATIENT
Start: 2024-10-27 | End: 2024-10-27 | Stop reason: HOSPADM

## 2024-10-27 RX ORDER — OXYCODONE AND ACETAMINOPHEN 5; 325 MG/1; MG/1
1 TABLET ORAL
Status: COMPLETED | OUTPATIENT
Start: 2024-10-27 | End: 2024-10-27

## 2024-10-27 RX ORDER — LIDOCAINE HYDROCHLORIDE 10 MG/ML
5 INJECTION, SOLUTION EPIDURAL; INFILTRATION; INTRACAUDAL; PERINEURAL ONCE
Status: COMPLETED | OUTPATIENT
Start: 2024-10-27 | End: 2024-10-27

## 2024-10-27 RX ADMIN — LIDOCAINE HYDROCHLORIDE 5 ML: 10 INJECTION, SOLUTION EPIDURAL; INFILTRATION; INTRACAUDAL; PERINEURAL at 14:50

## 2024-10-27 RX ADMIN — AMOXICILLIN AND CLAVULANATE POTASSIUM 1 TABLET: 875; 125 TABLET, FILM COATED ORAL at 15:41

## 2024-10-27 RX ADMIN — IBUPROFEN 800 MG: 800 TABLET, FILM COATED ORAL at 14:49

## 2024-10-27 RX ADMIN — OXYCODONE HYDROCHLORIDE AND ACETAMINOPHEN 1 TABLET: 5; 325 TABLET ORAL at 15:41

## 2024-10-27 ASSESSMENT — PAIN DESCRIPTION - ORIENTATION
ORIENTATION: LEFT

## 2024-10-27 ASSESSMENT — PAIN - FUNCTIONAL ASSESSMENT
PAIN_FUNCTIONAL_ASSESSMENT: NONE - DENIES PAIN
PAIN_FUNCTIONAL_ASSESSMENT: 0-10

## 2024-10-27 ASSESSMENT — PAIN DESCRIPTION - LOCATION
LOCATION: HAND

## 2024-10-27 ASSESSMENT — PAIN SCALES - GENERAL
PAINLEVEL_OUTOF10: 4
PAINLEVEL_OUTOF10: 8
PAINLEVEL_OUTOF10: 4

## 2024-10-27 ASSESSMENT — LIFESTYLE VARIABLES
HOW MANY STANDARD DRINKS CONTAINING ALCOHOL DO YOU HAVE ON A TYPICAL DAY: PATIENT DOES NOT DRINK
HOW OFTEN DO YOU HAVE A DRINK CONTAINING ALCOHOL: NEVER

## 2024-10-27 ASSESSMENT — PAIN DESCRIPTION - DESCRIPTORS: DESCRIPTORS: SHARP;DULL;THROBBING

## 2024-10-27 NOTE — ED PROVIDER NOTES
[facility] EMERGENCY DEPT  EMERGENCY DEPARTMENT ENCOUNTER      Pt Name: Myles Hutchinson  MRN: [id number]  Birthdate 1945  Date of evaluation: 10/27/2024  Provider: DANIA Hayden NP    CHIEF COMPLAINT       Chief Complaint   Patient presents with    Hand Injury         HISTORY OF PRESENT ILLNESS   (Location/Symptom, Timing/Onset, Context/Setting, Quality, Duration, Modifying Factors, Severity)  Note limiting factors.   The history is provided by the patient. No  was used.     Myles Hutchinson is a 79 y.o. male with Hx of thyroid nodule, COPD, restless leg syndrome, GERD, bipolar, diverticulitis, insomnia, memory concerns, prostate cancer who presents ambulatory to [facility] ED with cc of L Hand injury.     Patient arrives with open injuries to the distal tips of the fingers on his left hand.  He states that he was trying to make a bookcase, the saw got away from him and cut his hand open.  Tetanus is up-to-date.  Patient has limited range of motion and pain from his injury.  Wounds have not been cleaned.    Patient does not take any anticoagulation.  States he is otherwise been in his usual state of health and denies any other medical concerns.  Denies tobacco, alcohol or substance abuse.  PCP: Maurilio Figueroa MD    There are no other complaints, changes or physical findings at this time.        Review of External Medical Records:     Nursing Notes were reviewed.    REVIEW OF SYSTEMS    (2-9 systems for level 4, 10 or more for level 5)     Review of Systems   Musculoskeletal:  Positive for arthralgias and myalgias.   Skin:  Positive for wound.       Except as noted above the remainder of the review of systems was reviewed and negative.       PAST MEDICAL HISTORY     Past Medical History:   Diagnosis Date    Alzheimer disease (HCC) 11/2016    Rain's esophagus     Rain's esophagus with esophagitis     Bipolar disorder (HCC)     CAD (coronary artery disease)     minor calcification in heart

## 2024-10-27 NOTE — ED TRIAGE NOTES
Pt arrives to the ED, reports he was making a bookshelf and accidentally cut his hand on the saw. Pt has lacerations to the first, second, and third digits of his L hand. Pt denies taking blood thinners.

## 2024-10-28 NOTE — ED NOTES
Lac Repair    Date/Time: 10/27/2024 7:00 PM    Performed by: Colt Donahue MD  Authorized by: Colt Donahue MD    Consent:     Consent obtained:  Verbal    Risks discussed:  Pain, poor cosmetic result and poor wound healing  Universal protocol:     Patient identity confirmed:  Verbally with patient  Anesthesia:     Anesthesia method:  Nerve block    Block location:  Digital block of the second third and fourth fingers of the left hand    Block needle gauge:  25 G    Block anesthetic:  Lidocaine 1% w/o epi    Block injection procedure:  Anatomic landmarks identified, introduced needle, incremental injection, anatomic landmarks palpated and negative aspiration for blood    Block outcome:  Anesthesia achieved  Laceration details:     Location:  Finger    Finger location: Second and third finger of the left hand.    Length (cm):  6  Pre-procedure details:     Preparation:  Patient was prepped and draped in usual sterile fashion and imaging obtained to evaluate for foreign bodies  Treatment:     Area cleansed with:  Saline    Amount of cleaning:  Standard    Irrigation solution:  Sterile saline  Skin repair:     Repair method:  Sutures    Suture size:  4-0    Suture material:  Chromic gut    Suture technique:  Simple interrupted    Number of sutures:  9  Approximation:     Laceration repair approximation: Combination of close some loose repair based on avulsion.  Repair type:     Repair type:  Complex  Post-procedure details:     Dressing:  Splint for protection and non-adherent dressing    Procedure completion:  Tolerated well, no immediate complications         Colt Donahue MD  10/27/24 3625

## (undated) DEVICE — SYRINGE MEDICAL 3ML CLEAR PLASTIC STANDARD NON CONTROL LUERLOCK TIP DISPOSABLE

## (undated) DEVICE — BLUNTFILL WITH FILTER: Brand: MONOJECT

## (undated) DEVICE — KIT COLON W/ 1.1OZ LUB AND 2 END

## (undated) DEVICE — SOLIDIFIER FLD 2OZ 1500CC N DISINF IN BTL DISP SAFESORB

## (undated) DEVICE — BITEBLOCK ENDOSCP 60FR MAXI WHT POLYETH STURDY W/ VELC WVN

## (undated) DEVICE — IV STRT KT 3282] LSL INDUSTRIES INC]

## (undated) DEVICE — SYRINGE MED 5ML STD CLR PLAS LUERLOCK TIP N CTRL DISP

## (undated) DEVICE — ELECTRODE,RADIOTRANSLUCENT,FOAM,3PK: Brand: MEDLINE

## (undated) DEVICE — 1200 GUARD II KIT W/5MM TUBE W/O VAC TUBE: Brand: GUARDIAN

## (undated) DEVICE — CATHETER IV 22GA L1IN OD0.8382-0.9144MM ID0.6096-0.6858MM 382523

## (undated) DEVICE — SET ADMIN 16ML TBNG L100IN 2 Y INJ SITE IV PIGGY BK DISP (ORDER IN MULIPLES OF 48)

## (undated) DEVICE — BLUNTFILL: Brand: MONOJECT

## (undated) DEVICE — CANNULA CUSH AD W/ 14FT TBG